# Patient Record
Sex: MALE | Race: OTHER | Employment: FULL TIME | URBAN - METROPOLITAN AREA
[De-identification: names, ages, dates, MRNs, and addresses within clinical notes are randomized per-mention and may not be internally consistent; named-entity substitution may affect disease eponyms.]

---

## 2020-11-19 ENCOUNTER — OFFICE VISIT (OUTPATIENT)
Dept: FAMILY MEDICINE CLINIC | Age: 45
End: 2020-11-19

## 2020-11-19 ENCOUNTER — HOSPITAL ENCOUNTER (OUTPATIENT)
Dept: LAB | Age: 45
Discharge: HOME | End: 2020-11-19

## 2020-11-19 VITALS
HEIGHT: 64 IN | WEIGHT: 147.6 LBS | BODY MASS INDEX: 25.2 KG/M2 | SYSTOLIC BLOOD PRESSURE: 108 MMHG | TEMPERATURE: 98.4 F | DIASTOLIC BLOOD PRESSURE: 63 MMHG | HEART RATE: 69 BPM

## 2020-11-19 DIAGNOSIS — R68.83 CHILLS: ICD-10-CM

## 2020-11-19 DIAGNOSIS — M25.50 POLYARTHRALGIA: Primary | ICD-10-CM

## 2020-11-19 DIAGNOSIS — M25.50 POLYARTHRALGIA: ICD-10-CM

## 2020-11-19 PROCEDURE — 99203 OFFICE O/P NEW LOW 30 MIN: CPT | Performed by: FAMILY MEDICINE

## 2020-11-19 NOTE — PROGRESS NOTES
HISTORY OF PRESENT ILLNESS  Charo Peña is a 39 y.o. male. HPI  Patient states he has been having multiple joint pains for the past 6 months,  It involves the hand, wrist, elbows, feet and ankles. Is bilateral  He has been taking diclofenac that seems to help. Patient has also noticed chills for the past week, denies fever, no cough, no sore throat or fever. 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 HENT: Negative for congestion, hearing loss, nosebleeds and sinus pain. Eyes: Negative for blurred vision, double vision and photophobia. Respiratory: Negative for cough, hemoptysis, sputum production and stridor. Cardiovascular: Negative for chest pain, palpitations and orthopnea. Gastrointestinal: Negative for abdominal pain, heartburn, nausea and vomiting. Genitourinary: Negative for dysuria, frequency and urgency. Musculoskeletal: Positive for joint pain and myalgias. Skin: Negative for itching and rash. Neurological: Negative for dizziness, tingling and headaches. /63 (BP 1 Location: Left arm, BP Patient Position: Sitting)   Pulse 69   Temp 98.4 °F (36.9 °C)   Ht 5' 4.37\" (1.635 m)   Wt 147 lb 9.6 oz (67 kg)   BMI 25.05 kg/m²   Physical Exam  Constitutional:       General: He is not in acute distress. Appearance: He is not ill-appearing. HENT:      Head: Normocephalic. Neck:      Musculoskeletal: Normal range of motion. No neck rigidity. Cardiovascular:      Rate and Rhythm: Normal rate and regular rhythm. Pulses: Normal pulses. Heart sounds: Normal heart sounds. No murmur. Pulmonary:      Effort: Pulmonary effort is normal. No respiratory distress. Breath sounds: Normal breath sounds. No wheezing or rhonchi.    Abdominal: General: Bowel sounds are normal. There is no distension. Palpations: Abdomen is soft. Tenderness: There is no abdominal tenderness. Hernia: No hernia is present. Musculoskeletal: Normal range of motion. General: No swelling or tenderness. Skin:     General: Skin is warm. Coloration: Skin is not pale. Findings: No erythema. Neurological:      Mental Status: He is alert. ASSESSMENT and PLAN  Diagnoses and all orders for this visit:    1. Polyarthralgia  -     LIPID PANEL; Future  -     CBC WITH AUTOMATED DIFF; Future  -     METABOLIC PANEL, COMPREHENSIVE; Future  -     NILE BY MULTIPLEX FLOW IA, QL; Future  -     SED RATE (ESR); Future  -     RHEUMATOID FACTOR, QT; Future  -     HEMOGLOBIN A1C WITH EAG; Future  -     URIC ACID; Future    2. Chills      We will check labs today to explore for gout, rheumatoid arthritis and lupus. Symptoms could also be secondary to wear and tear. He has recently developed chills too,  I have advise patient to consider COVID-19 testing.   He will go today

## 2020-11-19 NOTE — PROGRESS NOTES
Pt supplied with [facility] on [place] testing site for free from 4-5 PM with address provided for the patient, no appointment needed. Pt verbalized understanding, denied having any further questions. Yajaira Regalado.  [facility], [address] [facility]

## 2020-11-20 LAB
ALBUMIN SERPL-MCNC: 3.6 G/DL (ref 3.5–5)
ALBUMIN/GLOB SERPL: 0.9 {RATIO} (ref 1.1–2.2)
ALP SERPL-CCNC: 79 U/L (ref 45–117)
ALT SERPL-CCNC: 60 U/L (ref 12–78)
ANION GAP SERPL CALC-SCNC: 5 MMOL/L (ref 5–15)
AST SERPL-CCNC: 32 U/L (ref 15–37)
BASOPHILS # BLD: 0 K/UL (ref 0–0.1)
BASOPHILS NFR BLD: 1 % (ref 0–1)
BILIRUB SERPL-MCNC: 0.7 MG/DL (ref 0.2–1)
BUN SERPL-MCNC: 10 MG/DL (ref 6–20)
BUN/CREAT SERPL: 13 (ref 12–20)
CALCIUM SERPL-MCNC: 9.1 MG/DL (ref 8.5–10.1)
CHLORIDE SERPL-SCNC: 104 MMOL/L (ref 97–108)
CHOLEST SERPL-MCNC: 181 MG/DL
CO2 SERPL-SCNC: 29 MMOL/L (ref 21–32)
CREAT SERPL-MCNC: 0.77 MG/DL (ref 0.7–1.3)
DIFFERENTIAL METHOD BLD: NORMAL
EOSINOPHIL # BLD: 0.1 K/UL (ref 0–0.4)
EOSINOPHIL NFR BLD: 1 % (ref 0–7)
ERYTHROCYTE [DISTWIDTH] IN BLOOD BY AUTOMATED COUNT: 13.1 % (ref 11.5–14.5)
ERYTHROCYTE [SEDIMENTATION RATE] IN BLOOD: 67 MM/HR (ref 0–15)
EST. AVERAGE GLUCOSE BLD GHB EST-MCNC: 108 MG/DL
GLOBULIN SER CALC-MCNC: 4.2 G/DL (ref 2–4)
GLUCOSE SERPL-MCNC: 88 MG/DL (ref 65–100)
HBA1C MFR BLD: 5.4 % (ref 4–5.6)
HCT VFR BLD AUTO: 43.9 % (ref 36.6–50.3)
HDLC SERPL-MCNC: 31 MG/DL
HDLC SERPL: 5.8 {RATIO} (ref 0–5)
HGB BLD-MCNC: 14.3 G/DL (ref 12.1–17)
IMM GRANULOCYTES # BLD AUTO: 0 K/UL (ref 0–0.04)
IMM GRANULOCYTES NFR BLD AUTO: 0 % (ref 0–0.5)
LDLC SERPL CALC-MCNC: 124.6 MG/DL (ref 0–100)
LIPID PROFILE,FLP: ABNORMAL
LYMPHOCYTES # BLD: 1.5 K/UL (ref 0.8–3.5)
LYMPHOCYTES NFR BLD: 25 % (ref 12–49)
MCH RBC QN AUTO: 29.5 PG (ref 26–34)
MCHC RBC AUTO-ENTMCNC: 32.6 G/DL (ref 30–36.5)
MCV RBC AUTO: 90.5 FL (ref 80–99)
MONOCYTES # BLD: 0.6 K/UL (ref 0–1)
MONOCYTES NFR BLD: 10 % (ref 5–13)
NEUTS SEG # BLD: 3.9 K/UL (ref 1.8–8)
NEUTS SEG NFR BLD: 63 % (ref 32–75)
NRBC # BLD: 0 K/UL (ref 0–0.01)
NRBC BLD-RTO: 0 PER 100 WBC
PLATELET # BLD AUTO: 372 K/UL (ref 150–400)
PMV BLD AUTO: 10 FL (ref 8.9–12.9)
POTASSIUM SERPL-SCNC: 4.4 MMOL/L (ref 3.5–5.1)
PROT SERPL-MCNC: 7.8 G/DL (ref 6.4–8.2)
RBC # BLD AUTO: 4.85 M/UL (ref 4.1–5.7)
RHEUMATOID FACT SERPL-ACNC: >600 IU/ML
SODIUM SERPL-SCNC: 138 MMOL/L (ref 136–145)
TRIGL SERPL-MCNC: 127 MG/DL (ref ?–150)
URATE SERPL-MCNC: 4.3 MG/DL (ref 3.5–7.2)
VLDLC SERPL CALC-MCNC: 25.4 MG/DL
WBC # BLD AUTO: 6.2 K/UL (ref 4.1–11.1)

## 2020-11-21 LAB — ANA SER QL: POSITIVE

## 2020-11-23 ENCOUNTER — TELEPHONE (OUTPATIENT)
Dept: FAMILY MEDICINE CLINIC | Age: 45
End: 2020-11-23

## 2020-11-23 DIAGNOSIS — M05.79 RHEUMATOID ARTHRITIS INVOLVING MULTIPLE SITES WITH POSITIVE RHEUMATOID FACTOR (HCC): Primary | ICD-10-CM

## 2020-11-23 RX ORDER — CELECOXIB 200 MG/1
200 CAPSULE ORAL 2 TIMES DAILY
Qty: 60 CAP | Refills: 2 | Status: SHIPPED | OUTPATIENT
Start: 2020-11-23 | End: 2021-02-21

## 2020-11-24 ENCOUNTER — OFFICE VISIT (OUTPATIENT)
Dept: FAMILY MEDICINE CLINIC | Age: 45
End: 2020-11-24

## 2020-11-24 DIAGNOSIS — Z71.89 COUNSELING AND COORDINATION OF CARE: Primary | ICD-10-CM

## 2020-11-24 PROCEDURE — 99080 SPECIAL REPORTS OR FORMS: CPT

## 2020-11-24 NOTE — PROGRESS NOTES
OW called patient and started financial screening for Access Now. OW explained the process to patient, he verbalized understanding. Pending POI. An appt was scheduled for tomorrow 11/25/2020 at 12:00m. OW screened patient for Craig Foyer and he replied NO to all questions.

## 2020-11-25 ENCOUNTER — OFFICE VISIT (OUTPATIENT)
Dept: FAMILY MEDICINE CLINIC | Age: 45
End: 2020-11-25

## 2020-11-25 DIAGNOSIS — Z71.89 COUNSELING AND COORDINATION OF CARE: Primary | ICD-10-CM

## 2020-11-25 PROCEDURE — 99080 SPECIAL REPORTS OR FORMS: CPT

## 2020-11-25 NOTE — PROGRESS NOTES
OW met with patient and patient completed pending forms. Access Now application was completed. OW instructed the patient to call Access Now on or after Dec 9th.  [facility]

## 2021-03-03 ENCOUNTER — TELEPHONE (OUTPATIENT)
Dept: RHEUMATOLOGY | Age: 46
End: 2021-03-03

## 2021-03-04 ENCOUNTER — OFFICE VISIT (OUTPATIENT)
Dept: RHEUMATOLOGY | Age: 46
End: 2021-03-04
Payer: SUBSIDIZED

## 2021-03-04 VITALS
BODY MASS INDEX: 23.69 KG/M2 | HEART RATE: 65 BPM | TEMPERATURE: 97.5 F | DIASTOLIC BLOOD PRESSURE: 77 MMHG | SYSTOLIC BLOOD PRESSURE: 125 MMHG | WEIGHT: 142.2 LBS | RESPIRATION RATE: 16 BRPM | OXYGEN SATURATION: 97 % | HEIGHT: 65 IN

## 2021-03-04 DIAGNOSIS — M05.79 SEROPOSITIVE RHEUMATOID ARTHRITIS OF MULTIPLE SITES (HCC): Primary | ICD-10-CM

## 2021-03-04 DIAGNOSIS — Z79.899 LONG-TERM USE OF HYDROXYCHLOROQUINE: ICD-10-CM

## 2021-03-04 DIAGNOSIS — Z79.60 LONG-TERM USE OF IMMUNOSUPPRESSANT MEDICATION: ICD-10-CM

## 2021-03-04 PROCEDURE — 99205 OFFICE O/P NEW HI 60 MIN: CPT | Performed by: INTERNAL MEDICINE

## 2021-03-04 RX ORDER — METHOTREXATE 2.5 MG/1
20 TABLET ORAL
COMMUNITY
End: 2021-03-04 | Stop reason: SDUPTHER

## 2021-03-04 RX ORDER — FOLIC ACID 1 MG/1
1 TABLET ORAL DAILY
Qty: 90 TAB | Refills: 5 | Status: SHIPPED | OUTPATIENT
Start: 2021-03-04 | End: 2021-05-13 | Stop reason: SDUPTHER

## 2021-03-04 RX ORDER — METHOTREXATE 2.5 MG/1
20 TABLET ORAL
Qty: 96 TAB | Refills: 1 | Status: SHIPPED | OUTPATIENT
Start: 2021-03-10 | End: 2021-05-13 | Stop reason: SDUPTHER

## 2021-03-04 RX ORDER — HYDROXYCHLOROQUINE SULFATE 200 MG/1
400 TABLET, FILM COATED ORAL DAILY
Qty: 180 TAB | Refills: 1 | Status: SHIPPED | OUTPATIENT
Start: 2021-03-04 | End: 2021-05-13 | Stop reason: ALTCHOICE

## 2021-03-04 NOTE — PROGRESS NOTES
REASON FOR VISIT    This is the initial evaluation for Mr. Saul Goncalves a 39 y.o.  [facility] male for question of an inflammatory arthritis. The patient is referred to the [place] at the request of Dr. Jeff Hernandez. HISTORY OF PRESENT ILLNESS      I have reviewed and summarized old records from [employer]    With [address] translation. In 5/2020, he developed pain and swelling in his hands, wrists, elbows, ankles and feet. He took diclofenac with some relief    In 11/19/2020, labs showed WBC 6.2, lymphocytes 1.5, Hct 43.9%, platelets 599,829, creatinine 0.77 mg/dL, eGFR >60, albumin 3.6 g/dL, ALK 79 U/L, ALT 60 U/L, AST 32 U/L, uric acid 4.3 mg/dL, HbA1c 5.4%, elevated ESR 67 mm/hr,  positive NILE direct, rheumatoid factor >600    In 2/26/2021, he started prednisone 10 mg daily hydroxychloroquine 400 mg daily, methotrexate 12.5 mg every 8 days, prescribed by rheumatology in [facility]. Today, he complains of pain in his hands, wrists, ankles, and feet. He feels worse at rest and improves with activity. He has swelling in his hands, wrists, ankles and feet, which has improved. He has morning stiffness lasting hours without being on his currently medications. He is not working now. He used to work in powder washing. Therapy History includes:  Current DMARD therapy includes: hydroxychloroquine 400 mg daily (2/26/2021 to present), methotrexate 12.5 mg every Wednesday (2/26/2021 to present)  Prior DMARD therapy includes: none  The following DMARDs have been ineffective: none  The following DMARDs were stopped because of side effects: none    REVIEW OF SYSTEMS    A 15 point review of systems was performed and summarized below. The questionnaire was reviewed with the patient and scanned into the patient's medical record.     General: denies recent weight gain, recent weight loss, fatigue, weakness, fever, drenching night sweats  Musculoskeletal: endorses joint pain, joint swelling, denies morning stiffness, muscle pain  Ears: denies ringing in ears, hearing loss, deafness  Eyes: denies pain, light sensitive, redness, blindness, double vision, blurred vision, excess tearing, dryness, foreign body sensation  Mouth: denies sore tongue, oral ulcers, loss of taste, dryness, increased dental caries  Nose: denies nosebleeds, nasal ulcers  Throat: denies food stuck when swallowing, difficulty with swallowing, hoarseness, pain in jaw while chewing  Neck: denies swollen glands, tender glands  Cardiopulmonary: denies pain in chest with deep breaths, pain in chest when lying down, murmurs, sudden changes in heart beat, wheezing, dry cough, productive cough, shortness of breath at rest, shortness of breath on exertion, coughing of blood  Gastrointestinal: denies nausea, heartburn, stomach pain relieved by food, chronic constipation, chronic diarrhea, blood in stools, black stools  Genitourinary: denies pain or burning on urination, blood in urine, cloudy urine, penile ulcers  Hematologic: denies anemia, bleeding tendency, blood clots, bleeding gums  Skin: denies easy bruising, hair loss, rash, rash worsened after sun exposure, hives/urticaria, skin thickening, skin tightness, nodules/bumps, color changes of hands or feet in the cold (Raynaud's)  Neurologic: denies numbness or tingling in hands, numbness or tingling in feet, muscle weakness  Psychiatric: denies depression, excessive worries, PTSD, Bipolar  Sleep: endorses snoring, denies poor sleep (8 hours), apnea, daytime somnolence, difficulty falling asleep, difficulty staying asleep     PAST MEDICAL HISTORY    He has a past medical history of Rheumatoid arteritis ([facility] [address] (01/2021). FAMILY HISTORY    His family history includes Osteoporosis in his mother. SOCIAL HISTORY    He reports that he has quit smoking. He has never used smokeless tobacco. He reports current alcohol use of about 2.0 standard drinks of alcohol per week.  He reports that he does not use drugs. MEDICATIONS    Current Outpatient Medications   Medication Sig    prednisolone (PRELONE PO) Take 10 mg by mouth. Takes half tablet     [START ON 3/10/2021] methotrexate (RHEUMATREX) 2.5 mg tablet Take 8 Tabs by mouth every Wednesday.  hydrOXYchloroQUINE (Plaquenil) 200 mg tablet Take 2 Tabs by mouth daily. Takes half tablet    folic acid (FOLVITE) 1 mg tablet Take 1 Tab by mouth daily. No current facility-administered medications for this visit. ALLERGIES  No Known Allergies    PHYSICAL EXAMINATION    Visit Vitals  /77 (BP 1 Location: Left upper arm, BP Patient Position: Sitting, BP Cuff Size: Small adult)   Pulse 65   Temp 97.5 °F (36.4 °C) (Oral)   Resp 16   Ht 5' 5.35\" (1.66 m)   Wt 142 lb 3.2 oz (64.5 kg)   SpO2 97%   BMI 23.41 kg/m²     Body mass index is 23.41 kg/m². General: Patient is alert, oriented x 3, not in acute distress    HEENT:   Conjunctiva are not injected and appear moist, there is no alopecia. Cardiovascular:  Heart is regular rate and rhythm, no murmurs. Chest:  Lungs are clear to auscultation bilaterally. Extremities:  Free of clubbing, cyanosis, edema, extremities well perfused. Neurological exam:  Muscle strength is full in upper and lower extremities. Skin exam:  There are no rashes, no tophi, no psoriasis, no active Raynaud's, no livedo reticularis, no periungual erythema. Musculoskeletal exam:  A comprehensive musculoskeletal exam was performed for all joints of each upper and lower extremity and assessed for swelling, tenderness and range of motion.  Pertinent results are documented as below:    Bilateral ankle synovitis  Bilateral MTP tenderness    Z-Deformities:   no  [place] Neck Deformities:  Yes (LEFT: 2nd, 4th, RIGHT: 2nd, 3rd, 4th)  Boutonierre's Deformities:  no  Ulnar Deviation:   no  MCP Subluxation:  no    Joint Count 3/4/2021   Patient pain (0-100) 25   MHAQ 0   Left elbow - Tender 1   Left elbow - Swollen 1   Left wrist- Tender 1   Left wrist- Swollen 1   Left 1st MCP - Tender 1   Left 1st MCP - Swollen 1   Left 2nd MCP - Tender 1   Left 2nd MCP - Swollen 1   Left 3rd MCP - Tender 1   Left 3rd MCP - Swollen 1   Left 4th MCP - Tender 1   Left 4th MCP - Swollen 0   Left 5th MCP - Tender 1   Left 5th MCP - Swollen 1   Left thumb IP - Tender 1   Left thumb IP - Swollen 1   Left 2nd PIP - Tender 1   Left 2nd PIP - Swollen 1   Left 3rd PIP - Tender 1   Left 3rd PIP - Swollen 1   Left 4th PIP - Tender 1   Left 4th PIP - Swollen 1   Left 5th PIP - Tender 1   Left 5th PIP - Swollen 1   Right elbow - Tender 1   Right elbow - Swollen 1   Right wrist- Tender 1   Right wrist- Swollen 1   Right 1st MCP - Tender 1   Right 1st MCP - Swollen 1   Right 2nd MCP - Tender 1   Right 2nd MCP - Swollen 0   Right 3rd MCP - Tender 1   Right 3rd MCP - Swollen 1   Right 4th MCP - Tender 1   Right 4th MCP - Swollen 1   Right 5th MCP - Tender 1   Right 5th MCP - Swollen 1   Right thumb IP - Tender 1   Right thumb IP - Swollen 1   Right 2nd PIP - Tender 1   Right 2nd PIP - Swollen 1   Right 3rd PIP - Tender 1   Right 3rd PIP - Swollen 1   Right 4th PIP - Tender 1   Right 4th PIP - Swollen 1   Right 5th PIP - Tender 1   Right 5th PIP - Swollen 1   Right knee - Tender 1   Right knee - Swollen 1   Tender Joint Count (Total) 25   Swollen Joint Count (Total) 23   Physician Assessment (0-10) 5   Patient Assessment (0-10) 2.5   CDAI Total (calculated) 55.5       DATA REVIEW    Prior medical records were reviewed and are summarized as below:    Laboratory data: summarized in the HPI    Imaging: summarized in the HPI. ASSESSMENT AND PLAN    1) Seropositive Rheumatoid Arthritis. He appears to have chronic Rheumatoid Arthritis that started around 5/2020. He was recently started on prednisone 10 mg daily, hydroxychloroquine 400 mg daily, and methotrexate 12.5 mg every Wednesday.  These medications were prescribed by his doctor in [place]    He feels much better on treated. His CDAI was 55.5 with 25 tender and 23 swollen joints, with bilateral ankle and MTP involvement, consistent with high disease activity. I will increase methotrexate to 20 mg every Wednesday and will reduce his prednisone to 5 mg daily until he has completed his box. I ordered labs. 2) Long Term Use of Immunosuppressants. The patient remains on high risk immunomodulatory medications (methotrexate) and requires frequent toxicity monitoring by blood work to evaluate for toxicities. The patient's most recent labs on 11/19/2020, which I reviewed with them, were unremarkable. Respective labs were ordered (CBC and CMP) and lab slip provided. 3) Long Term Use of Hydroxychloroquine (Plaquenil). The patient voiced understanding of the aforementioned assessment and plan. Summary of plan was provided in the After Visit Summary patient instructions. I also provided education about MyChart setup and utility. A total of 62 minutes was spent on this visit, reviewing interval notes, interval testing results, ordering tests, refilling medications, documenting the findings in the note, patient education, counseling, and coordination of care as described above. All questions asked and answered.     TODAY'S ORDERS    Orders Placed This Encounter    QUANTIFERON-TB GOLD PLUS    CYCLIC CITRUL PEPTIDE AB, IGG    CBC WITH AUTOMATED DIFF    CHRONIC HEPATITIS PANEL    METABOLIC PANEL, COMPREHENSIVE    C REACTIVE PROTEIN, QT    SED RATE (ESR)    PROTEIN ELECTROPHORESIS W/ REFLX DON    prednisolone (PRELONE PO)    methotrexate (RHEUMATREX) 2.5 mg tablet    hydrOXYchloroQUINE (Plaquenil) 200 mg tablet    folic acid (FOLVITE) 1 mg tablet     Future Appointments   Date Time Provider Michelle Ho   5/13/2021 11:20 AM Damien Marin MD AOCR BS AMB     Susan Rob MD, [address] [facility] [address]    Adult Rheumatology   Rheumatology Ultrasound Certified  Ronald ERICKSON Part of [facility]CTORS [facility], [address] [place] [address] Road   Phone [phone]  Fax [phone]    Patient Care Team:  Tanya Blackwell MD as PCP - General (Family Medicine)  Tanya Blackwell MD as PCP - [facility]

## 2021-03-04 NOTE — LETTER
3/4/2021 Patient: Jaja Ha YOB: 1975 Date of Visit: 3/4/2021 Ricki Almaraz MD 
[address] [facility] [address] [address] [address] [employer] Dear Ricki Almaraz MD, Thank you for referring Mr. Staci Garcia to [address] [place] [address] for evaluation. My notes for this consultation are attached. If you have questions, please do not hesitate to call me. I look forward to following your patient along with you.  
 
 
Sincerely, 
 
Sujatha Guevara MD

## 2021-03-04 NOTE — PATIENT INSTRUCTIONS
Increase methotrexate to 8 tablets every Wednesday at bedtime    Increase Plaquenil to 400 mg daily    Reduce prednisone to 5 mg daily (quarter of 20 mg)    Take folic acid 1 mg daily    Do blood work at [facility]. Aumente el metotrexato a 8 tabletas todos los miércoles a la hora de acostarse    Aumente el Plaquenil a 400 mg al día. Reduce la prednisona a 5 mg al día (un cuarto de 20 mg)    [place] 1 mg de ácido fólico al día. Realice análisis de [place].

## 2021-03-04 NOTE — PROGRESS NOTES
Chief Complaint   Patient presents with    Establish Care    Arthritis     fingers,hands,toes,wrists and ankles     Patient states he was unable to move or get dressed before he saw doctor in 01/21. He has lived here 25 years. 3 most recent PHQ Screens 3/4/2021   Little interest or pleasure in doing things Not at all   Feeling down, depressed, irritable, or hopeless Not at all   Total Score PHQ 2 0     Visit Vitals  /77 (BP 1 Location: Left upper arm, BP Patient Position: Sitting, BP Cuff Size: Small adult)   Pulse 65   Temp 97.5 °F (36.4 °C) (Oral)   Resp 16   Ht 5' 5.35\" (1.66 m)   Wt 142 lb 3.2 oz (64.5 kg)   SpO2 97%   BMI 23.41 kg/m²     1. Have you been to the ER, urgent care clinic since your last visit? Hospitalized since your last visit?no    2. Have you seen or consulted any other health care providers outside of the [address] [place] [address] since your last visit? Include any pap smears or colon screening.  no

## 2021-03-17 ENCOUNTER — TELEPHONE (OUTPATIENT)
Dept: FAMILY MEDICINE CLINIC | Age: 46
End: 2021-03-17

## 2021-03-17 NOTE — TELEPHONE ENCOUNTER
[facility] called the patient and I told the pt to come Friday 3/19 for a lab visit. Patient stated she will be come to the [facility] office in the morning. From: Miranda Craven MD   Sent: 3/17/2021   8:49 AM EDT   To: Citlali Al   Subject: RE: PATIENT REQUESTS                          Yes Adam Vick,     I will also be there tomorrow if he wants to come in for lab visit tomorrow   ----- Message -----   From: Lyndon Hernández   Sent: 3/17/2021   8:16 AM EDT   To: Lucas Cage MD   Subject: PATIENT REQUESTS                         Good morning Dr. Jaye Moon patient came yesterday 3/16,to the [place] [address] at [facility]. I made copy of his [facility] visit to Dr. Melo Collado MD. Patient stated his rheumatologist ordered labs. I told the patient I was going to contact you to be sure if he needs to call the [facility] and make a f  appointment with you before you ordered the labs or if we just need  to schedule his labs at [facility] or [facility].   I checked his chart and Dr Melany Sher ordered his labs and are the same as the AVS. Do you need to post the same labs with your name or can we offer a lab appointment and draw the labs and use your name as the Dr? Please let me know that I can call the patient and telling him to come this Friday to do his labs. Fridays we have only one doctor and Naheed Rico will be doing labs.  Thank you, Citlali Al

## 2021-03-18 ENCOUNTER — TELEPHONE (OUTPATIENT)
Dept: FAMILY MEDICINE CLINIC | Age: 46
End: 2021-03-18

## 2021-03-18 DIAGNOSIS — M05.79 RHEUMATOID ARTHRITIS INVOLVING MULTIPLE SITES WITH POSITIVE RHEUMATOID FACTOR (HCC): Primary | ICD-10-CM

## 2021-03-19 ENCOUNTER — HOSPITAL ENCOUNTER (OUTPATIENT)
Dept: LAB | Age: 46
Discharge: HOME | End: 2021-03-19

## 2021-03-19 ENCOUNTER — LAB ONLY (OUTPATIENT)
Dept: FAMILY MEDICINE CLINIC | Age: 46
End: 2021-03-19

## 2021-03-19 DIAGNOSIS — M05.79 RHEUMATOID ARTHRITIS INVOLVING MULTIPLE SITES WITH POSITIVE RHEUMATOID FACTOR (HCC): ICD-10-CM

## 2021-03-19 PROCEDURE — 80074 ACUTE HEPATITIS PANEL: CPT

## 2021-03-19 PROCEDURE — 84165 PROTEIN E-PHORESIS SERUM: CPT

## 2021-03-19 PROCEDURE — 86140 C-REACTIVE PROTEIN: CPT

## 2021-03-19 PROCEDURE — 86480 TB TEST CELL IMMUN MEASURE: CPT

## 2021-03-19 PROCEDURE — 86200 CCP ANTIBODY: CPT

## 2021-03-19 PROCEDURE — 85025 COMPLETE CBC W/AUTO DIFF WBC: CPT

## 2021-03-19 PROCEDURE — 85652 RBC SED RATE AUTOMATED: CPT

## 2021-03-19 PROCEDURE — 80053 COMPREHEN METABOLIC PANEL: CPT

## 2021-03-20 LAB
ALBUMIN SERPL-MCNC: 3.9 G/DL (ref 3.5–5)
ALBUMIN/GLOB SERPL: 0.8 {RATIO} (ref 1.1–2.2)
ALP SERPL-CCNC: 75 U/L (ref 45–117)
ALT SERPL-CCNC: 68 U/L (ref 12–78)
ANION GAP SERPL CALC-SCNC: 4 MMOL/L (ref 5–15)
AST SERPL-CCNC: 35 U/L (ref 15–37)
BASOPHILS # BLD: 0 K/UL (ref 0–0.1)
BASOPHILS NFR BLD: 1 % (ref 0–1)
BILIRUB SERPL-MCNC: 1 MG/DL (ref 0.2–1)
BUN SERPL-MCNC: 12 MG/DL (ref 6–20)
BUN/CREAT SERPL: 15 (ref 12–20)
CALCIUM SERPL-MCNC: 9.1 MG/DL (ref 8.5–10.1)
CHLORIDE SERPL-SCNC: 106 MMOL/L (ref 97–108)
CO2 SERPL-SCNC: 30 MMOL/L (ref 21–32)
COMMENT, HOLDF: NORMAL
CREAT SERPL-MCNC: 0.8 MG/DL (ref 0.7–1.3)
CRP SERPL-MCNC: 0.33 MG/DL (ref 0–0.6)
DIFFERENTIAL METHOD BLD: ABNORMAL
EOSINOPHIL # BLD: 0.2 K/UL (ref 0–0.4)
EOSINOPHIL NFR BLD: 5 % (ref 0–7)
ERYTHROCYTE [DISTWIDTH] IN BLOOD BY AUTOMATED COUNT: 14.6 % (ref 11.5–14.5)
ERYTHROCYTE [SEDIMENTATION RATE] IN BLOOD: 34 MM/HR (ref 0–15)
GLOBULIN SER CALC-MCNC: 4.9 G/DL (ref 2–4)
GLUCOSE SERPL-MCNC: 89 MG/DL (ref 65–100)
HCT VFR BLD AUTO: 46.4 % (ref 36.6–50.3)
HGB BLD-MCNC: 15 G/DL (ref 12.1–17)
IMM GRANULOCYTES # BLD AUTO: 0 K/UL (ref 0–0.04)
IMM GRANULOCYTES NFR BLD AUTO: 0 % (ref 0–0.5)
LYMPHOCYTES # BLD: 1.1 K/UL (ref 0.8–3.5)
LYMPHOCYTES NFR BLD: 28 % (ref 12–49)
MCH RBC QN AUTO: 28.7 PG (ref 26–34)
MCHC RBC AUTO-ENTMCNC: 32.3 G/DL (ref 30–36.5)
MCV RBC AUTO: 88.7 FL (ref 80–99)
MONOCYTES # BLD: 0.5 K/UL (ref 0–1)
MONOCYTES NFR BLD: 14 % (ref 5–13)
NEUTS SEG # BLD: 2 K/UL (ref 1.8–8)
NEUTS SEG NFR BLD: 52 % (ref 32–75)
NRBC # BLD: 0 K/UL (ref 0–0.01)
NRBC BLD-RTO: 0 PER 100 WBC
PLATELET # BLD AUTO: 275 K/UL (ref 150–400)
PMV BLD AUTO: 10.1 FL (ref 8.9–12.9)
POTASSIUM SERPL-SCNC: 4 MMOL/L (ref 3.5–5.1)
PROT SERPL-MCNC: 8.8 G/DL (ref 6.4–8.2)
RBC # BLD AUTO: 5.23 M/UL (ref 4.1–5.7)
SAMPLES BEING HELD,HOLD: NORMAL
SODIUM SERPL-SCNC: 140 MMOL/L (ref 136–145)
WBC # BLD AUTO: 3.9 K/UL (ref 4.1–11.1)

## 2021-03-22 LAB
HAV IGM SER QL: NONREACTIVE
HBV CORE IGM SER QL: NONREACTIVE
HBV SURFACE AG SER QL: <0.1 INDEX
HBV SURFACE AG SER QL: NEGATIVE
HCV AB SERPL QL IA: NONREACTIVE
HCV COMMENT,HCGAC: NORMAL
M TB IFN-G BLD-IMP: NEGATIVE
QUANTIFERON CRITERIA, QFI1T: NORMAL
QUANTIFERON MITOGEN VALUE: >10 IU/ML
QUANTIFERON NIL VALUE: 0.16 IU/ML
QUANTIFERON TB1 AG: 0.16 IU/ML
QUANTIFERON TB2 AG: 0.14 IU/ML
SP1: NORMAL
SP2: NORMAL
SP3: NORMAL

## 2021-03-23 LAB
ALBUMIN SERPL ELPH-MCNC: 3.7 G/DL (ref 2.9–4.4)
ALBUMIN/GLOB SERPL: 0.8 {RATIO} (ref 0.7–1.7)
ALPHA1 GLOB SERPL ELPH-MCNC: 0.2 G/DL (ref 0–0.4)
ALPHA2 GLOB SERPL ELPH-MCNC: 0.7 G/DL (ref 0.4–1)
B-GLOBULIN SERPL ELPH-MCNC: 1.1 G/DL (ref 0.7–1.3)
CCP IGA+IGG SERPL IA-ACNC: 186 UNITS (ref 0–19)
GAMMA GLOB SERPL ELPH-MCNC: 2.8 G/DL (ref 0.4–1.8)
GLOBULIN SER CALC-MCNC: 4.7 G/DL (ref 2.2–3.9)
M PROTEIN SERPL ELPH-MCNC: 1.8 G/DL
PROT SERPL-MCNC: 8.4 G/DL (ref 6–8.5)

## 2021-05-13 ENCOUNTER — OFFICE VISIT (OUTPATIENT)
Dept: RHEUMATOLOGY | Age: 46
End: 2021-05-13
Payer: SUBSIDIZED

## 2021-05-13 VITALS
HEIGHT: 65 IN | SYSTOLIC BLOOD PRESSURE: 95 MMHG | DIASTOLIC BLOOD PRESSURE: 60 MMHG | WEIGHT: 145 LBS | BODY MASS INDEX: 24.16 KG/M2 | HEART RATE: 84 BPM | TEMPERATURE: 97.8 F | RESPIRATION RATE: 18 BRPM

## 2021-05-13 DIAGNOSIS — M05.79 SEROPOSITIVE RHEUMATOID ARTHRITIS OF MULTIPLE SITES (HCC): Primary | ICD-10-CM

## 2021-05-13 DIAGNOSIS — M05.79 SEROPOSITIVE RHEUMATOID ARTHRITIS OF MULTIPLE SITES (HCC): ICD-10-CM

## 2021-05-13 PROCEDURE — 99215 OFFICE O/P EST HI 40 MIN: CPT | Performed by: INTERNAL MEDICINE

## 2021-05-13 RX ORDER — ONDANSETRON 2 MG/ML
8 INJECTION INTRAMUSCULAR; INTRAVENOUS AS NEEDED
Status: CANCELLED | OUTPATIENT
Start: 2021-05-13

## 2021-05-13 RX ORDER — HEPARIN 100 UNIT/ML
300-500 SYRINGE INTRAVENOUS AS NEEDED
Status: CANCELLED
Start: 2021-05-13

## 2021-05-13 RX ORDER — DIPHENHYDRAMINE HYDROCHLORIDE 50 MG/ML
50 INJECTION, SOLUTION INTRAMUSCULAR; INTRAVENOUS AS NEEDED
Status: CANCELLED
Start: 2021-05-13

## 2021-05-13 RX ORDER — ACETAMINOPHEN 325 MG/1
650 TABLET ORAL ONCE
Status: CANCELLED
Start: 2021-05-13 | End: 2021-05-13

## 2021-05-13 RX ORDER — DIPHENHYDRAMINE HCL 25 MG
50 CAPSULE ORAL ONCE
Status: CANCELLED | OUTPATIENT
Start: 2021-05-13 | End: 2021-05-13

## 2021-05-13 RX ORDER — SODIUM CHLORIDE 9 MG/ML
25 INJECTION, SOLUTION INTRAVENOUS CONTINUOUS
Status: CANCELLED | OUTPATIENT
Start: 2021-05-13

## 2021-05-13 RX ORDER — DIPHENHYDRAMINE HYDROCHLORIDE 50 MG/ML
25 INJECTION, SOLUTION INTRAMUSCULAR; INTRAVENOUS AS NEEDED
Status: CANCELLED
Start: 2021-05-13

## 2021-05-13 RX ORDER — METHOTREXATE 2.5 MG/1
20 TABLET ORAL
Qty: 96 TAB | Refills: 1 | Status: SHIPPED | OUTPATIENT
Start: 2021-05-19 | End: 2021-08-31 | Stop reason: SDUPTHER

## 2021-05-13 RX ORDER — HYDROCORTISONE SODIUM SUCCINATE 100 MG/2ML
100 INJECTION, POWDER, FOR SOLUTION INTRAMUSCULAR; INTRAVENOUS AS NEEDED
Status: CANCELLED | OUTPATIENT
Start: 2021-05-13

## 2021-05-13 RX ORDER — FOLIC ACID 1 MG/1
1 TABLET ORAL DAILY
Qty: 90 TAB | Refills: 5 | Status: SHIPPED | OUTPATIENT
Start: 2021-05-13 | End: 2021-08-31 | Stop reason: SDUPTHER

## 2021-05-13 RX ORDER — ALBUTEROL SULFATE 0.83 MG/ML
2.5 SOLUTION RESPIRATORY (INHALATION) AS NEEDED
Status: CANCELLED
Start: 2021-05-13

## 2021-05-13 RX ORDER — EPINEPHRINE 1 MG/ML
0.3 INJECTION, SOLUTION, CONCENTRATE INTRAVENOUS AS NEEDED
Status: CANCELLED | OUTPATIENT
Start: 2021-05-13

## 2021-05-13 RX ORDER — SODIUM CHLORIDE 0.9 % (FLUSH) 0.9 %
10 SYRINGE (ML) INJECTION AS NEEDED
Status: CANCELLED | OUTPATIENT
Start: 2021-05-13

## 2021-05-13 RX ORDER — HYDROXYCHLOROQUINE SULFATE 200 MG/1
400 TABLET, FILM COATED ORAL DAILY
Qty: 180 TAB | Refills: 1 | Status: CANCELLED | OUTPATIENT
Start: 2021-05-13

## 2021-05-13 RX ORDER — ACETAMINOPHEN 325 MG/1
650 TABLET ORAL AS NEEDED
Status: CANCELLED
Start: 2021-05-13

## 2021-05-13 RX ORDER — SODIUM CHLORIDE 9 MG/ML
10 INJECTION INTRAMUSCULAR; INTRAVENOUS; SUBCUTANEOUS AS NEEDED
Status: CANCELLED | OUTPATIENT
Start: 2021-05-13

## 2021-05-13 NOTE — LETTER
5/13/2021 Patient: Deyanira Bah YOB: 1975 Date of Visit: 5/13/2021 Ni Jones MD 
[place] [address] [address] [facility] [address] [address] [address] [employer] Dear Ni Jones MD, Thank you for referring Mr. Yen Smith to [address] [place] [address] for evaluation. My notes for this consultation are attached. If you have questions, please do not hesitate to call me. I look forward to following your patient along with you.  
 
 
Sincerely, 
 
Khoa Barnard MD

## 2021-05-13 NOTE — PATIENT INSTRUCTIONS
Infliximab-abda (Por inyección)   Se [place] para tratar la artritis reumatoide, artritis psoriásica, espondilitis anquilosante, enfermedad de Crohn, las placas de psoriasis y colitis ulcerativa. Yessica(s) : Renflexis   Existen muchas otras marcas de kendy medicamento. Kendy medicamento no debe ser usado cuando:   Kendy medicamento no es adecuado para todas las personas. Usted no debe recibirlo si rivera tenido akosua reacción alérgica a los productos con infliximab o a las proteínas murinas (ratón). Forma de usar kendy medicamento:   Mireya Santos  · Jay médico le prescribirá jay dosis y horario. Kendy medicamento se administra a través de akosua aguja en la vena. Kendy medicamento debe administrarse lentamente. La aguja tiene que permanecer colocada abdirahman al menos 2 horas. · Gladys Elder u otro médico le administrará kendy medicamento. · [facility] debe venir con Julianna Barragan del medicamento. Solicite akosua copia con jay farmacéutico en lorena de no tener la guía. Medicamentos y [employer] que debe evitar:   Consulte con jay médico o farmacéutico antes de usar cualquier medicamento, incluyendo los que compra sin receta médica, las vitaminas y los productos herbales. · Algunos alimentos y medicamentos pueden afectar el funcionamiento de infliximab-abda. Informe a jay médico si está usando cualquiera de los siguientes:  ¨ Abatacept, anakinra, ciclosporina, etanercept, teofilina, tocilizumab  ¨ Anticoagulantes (incluyendo warfarina)  ¨ Medicamentos que debilitan el sistema inmunológico (incluyendo metotrexato o un esteroide)  · Infórmele al médico si usted ha recibido fototerapia para tratar la psoriasis. · Usted no debe recibir akosua vacuna sin aprobación médica. More[place] Loges de virus vivos podría causar akosua infección. Los niños deben estar al corriente con todas las vacunas antes de comenzar el tratamiento con kendy medicamento. Si usted recibió infliximab-abda abdirahman el embarazo, informe al médico de jay bebé.   Precauciones abdirahman el uso de kendy medicamento:   · Infórmele al médico si usted está embarazada o dando de lactar, o si padece de insuficiencia cardíaca, enfermedad de obstrucción pulmonar crónica (EOPC), enfermedad hepática, un trastorno sanguíneo, problemas de la damian o [address], cáncer, o enfermedad cardíaca. Infórmele al médico si usted tiene antecedentes de convulsiones, esclerosis múltiple, síndrome de Guillain-Barré, u otra enfermedad similar del sistema nervioso. · Kendy medicamento puede causar los siguientes problemas:  ¨ Mayor riesgo de linfoma u otros cánceres (inclusive cáncer de la piel)  ¨ Daño hepático  ¨ Reacción a la infusión abdirahman o después de la infusión, o si comienza a usar el medicamento nuevamente después de no recibirlo por un tiempo prolongado  ¨ Síndrome parecido al lupus  · [place] medicamento podría causarle sangrado, moretones, y que desarrolle infecciones con más facilidad. [place] precauciones para prevenir enfermedades y lesiones. Lávese las bina frecuentemente. · Kendy medicamento aumenta guzman riego de contraer infecciones, y puede resultar en akosua enfermedad severa o de peligro mortal. Infórmele a guzman médico si usted tiene un historial de infecciones frecuentes o serias, incluyendo tuberculosis o hepatitis B. Asegúrese que guzman médico sepa si tiene akosua infección actual o si padece de problemas con el sistema inmunológico. El riesgo puede ser [place] en personas mayores de 72 años o que tienen diabetes. Evite a las personas con enfermedades, y [employer]. · Guzman médico tendrá que revisar guzman progreso y los efectos de kendy medicamento abdirahman russ citas regulares. Cumpla sin falta con todas russ citas médicas. Asista a todas russ citas.   Efectos secundarios que pueden presentarse abdirahman el uso de kendy medicamento:   Consulte inmediatamente con el médico si nota cualquiera de estos efectos secundarios:  · Reacción alérgica: Comezón o ronchas, hinchazón del liliane o las bina, hinchazón u hormigueo en la boca o garganta, opresión en el pecho, dificultad para respirar  · [facility] ([place]) oscura o heces pálidas, náuseas, vómitos, falta de apetito, dolor estomacal, coloración amarillenta en la piel u ojos  · Facundo, escalofríos, tos, flujo o congestión nasal, dolor de garganta, shu de cuerpo  · Dolor o inflamación en las articulaciones o los músculos, problemas para tragar  · Dolor de marcia, desvanecimiento, mareos, dificultad para respirar, sarpullido  · Convulsiones, adormecimiento, hormigueo, problemas con la vista, el habla o al caminar  · [employer], moreton, cansancio o debilidad  · Piel tibia, lesia, inflamada o adolorida, ampollas o llagas en la piel  Consulte con el médico si nota los siguientes efectos secundarios menos graves:   · Enrojecimiento, dolor, o inflamación donde se coloca la aguja  Consulte con el médico si nota otros efectos secundarios que shamika son causados por brody medicamento. Llame a guzman médico para consultarle Richy. Usted puede notificar russ efectos secundarios al FDA al [phone]. © 2017 [facility] Information is for End User's use only and may not be sold, redistributed or otherwise used for commercial purposes. Esta información es sólo para uso en educación. Guzman intención no es darle un consejo médico sobre enfermedades o tratamientos. Colsulte con guzman [place] farmacéutico antes de seguir cualquier régimen médico para saber si es seguro y efectivo para usted.

## 2021-05-13 NOTE — PROGRESS NOTES
REASON FOR VISIT    This is a follow-up visit for Mr. Lidia Roberson for     ICD-10-CM   1. Seropositive rheumatoid arthritis of multiple sites ([facility].)  M05.79     Inflammatory arthritis phenotype includes:  Anti-CCP positive: yes (186)  Rheumatoid factor positive: yes (>600)  Erosive disease: N/A  Extra-articular manifestations include: none    Immunosuppression Screening (3/19/2021): Quantiferon TB: negative  PPD:  Not performed  Hepatitis B: negative  Hepatitis C: negative    Therapy History includes:  Current DMARD therapy includes: hydroxychloroquine 400 mg daily (2/26/2021 to present), methotrexate 20 mg every Wednesday (3/04/2021 to present)  Prior DMARD therapy includes: methotrexate 12.5 mg every Wednesday (2/26/2021 to 3/04/2021)  The following DMARDs have been ineffective: none  The following DMARDs were stopped because of side effects: none    HISTORY OF PRESENT ILLNESS    Mr. Lidia Roberson returns for a follow-up. On his last visit, I continued hydroxychloroquine 400 mg daily and increased his methotrexate from 15 to 20 mg every Wednesday and will reduce his prednisone to 5 mg daily until he has completed his box. Today, he feels good but has pain and swelling in his left wrist and bilateral ankle. His pain is persistent all day. He feels a little better with hot water and prednisone if he takes it. He feels that his nose is dry. He also develops a pruritis if he is exposed to the sun. He denies fever, weight loss, blurred vision, vision loss, oral ulcers, ankle swelling, dry cough, dyspnea, nausea, vomiting, dysphagia, abdominal pain, black or bloody stool, fall since last visit, rash, easy bruising and increased thirst.    Most recent toxicity monitoring by blood work was done on 3/19/2021 and did not reveal any significant adverse effects    Most recent inflammatory markers from 3/19/2021 revealed a ESR 34 mm/hr and CRP 0.33 mg/L.     I reviewed the patient's interval medical history, surgical history, medications, and allergies. The patient has not had any interval hospital admissions, infections, or surgeries. REVIEW OF SYSTEMS    A comprehensive review of systems was performed and pertinent results are documented in the HPI, review of systems is otherwise non-contributory. PAST MEDICAL HISTORY    He has a past medical history of Rheumatoid arteritis ([facility] (01/2021). FAMILY HISTORY    His family history includes Osteoporosis in his mother. SOCIAL HISTORY    He reports that he has quit smoking. He has never used smokeless tobacco. He reports current alcohol use of about 2.0 standard drinks of alcohol per week. He reports that he does not use drugs. MEDICATIONS    Current Outpatient Medications   Medication Sig    [START ON 5/19/2021] methotrexate (RHEUMATREX) 2.5 mg tablet Take 8 Tabs by mouth every Wednesday.  folic acid (FOLVITE) 1 mg tablet Take 1 Tab by mouth daily.  prednisolone (PRELONE PO) Take 10 mg by mouth. Takes half tablet      No current facility-administered medications for this visit. ALLERGIES    No Known Allergies    PHYSICAL EXAMINATION    Visit Vitals  BP 95/60   Pulse 84   Temp 97.8 °F (36.6 °C)   Resp 18   Ht 5' 5\" (1.651 m)   Wt 145 lb (65.8 kg)   BMI 24.13 kg/m²     Body mass index is 24.13 kg/m². General: Patient is alert, oriented x 3, not in acute distress    HEENT:   Conjunctiva are not injected and appear moist, there is no alopecia. Cardiovascular:  Heart is regular rate and rhythm, no murmurs. Chest:  Lungs are clear to auscultation bilaterally. Extremities:  Free of clubbing, cyanosis, edema, extremities well perfused. Neurological exam:  Muscle strength is full in upper and lower extremities. Skin exam:  There are no rashes, no tophi, no psoriasis, no active Raynaud's, no livedo reticularis, no periungual erythema.     Musculoskeletal exam:  A comprehensive musculoskeletal exam was performed for all joints of each upper and lower extremity and assessed for swelling, tenderness and range of motion.  Positive results are documented as below:    Bilateral ankle synovitis  Bilateral MTP tenderness (RESOLVED)     Z-Deformities:                         no  [place] Neck Deformities:         Yes (LEFT: 2nd, 4th, RIGHT: 2nd, 3rd, 4th)  Boutonierre's Deformities:      no  Ulnar Deviation:                      no  MCP Subluxation:                   no       Joint Count 5/13/2021 3/4/2021   Patient pain (0-100) 80 25   MHAQ 1 0   Left elbow - Tender - 1   Left elbow - Swollen - 1   Left wrist- Tender 1 1   Left wrist- Swollen 1 1   Left 1st MCP - Tender 1 1   Left 1st MCP - Swollen 1 1   Left 2nd MCP - Tender - 1   Left 2nd MCP - Swollen - 1   Left 3rd MCP - Tender - 1   Left 3rd MCP - Swollen - 1   Left 4th MCP - Tender - 1   Left 4th MCP - Swollen - 0   Left 5th MCP - Tender 1 1   Left 5th MCP - Swollen 0 1   Left thumb IP - Tender - 1   Left thumb IP - Swollen - 1   Left 2nd PIP - Tender 1 1   Left 2nd PIP - Swollen 1 1   Left 3rd PIP - Tender 0 1   Left 3rd PIP - Swollen 1 1   Left 4th PIP - Tender 0 1   Left 4th PIP - Swollen 1 1   Left 5th PIP - Tender - 1   Left 5th PIP - Swollen - 1   Right elbow - Tender - 1   Right elbow - Swollen - 1   Right wrist- Tender 1 1   Right wrist- Swollen 1 1   Right 1st MCP - Tender 1 1   Right 1st MCP - Swollen 1 1   Right 2nd MCP - Tender - 1   Right 2nd MCP - Swollen - 0   Right 3rd MCP - Tender 1 1   Right 3rd MCP - Swollen 1 1   Right 4th MCP - Tender 1 1   Right 4th MCP - Swollen 0 1   Right 5th MCP - Tender 1 1   Right 5th MCP - Swollen 0 1   Right thumb IP - Tender - 1   Right thumb IP - Swollen - 1   Right 2nd PIP - Tender 1 1   Right 2nd PIP - Swollen 0 1   Right 3rd PIP - Tender 1 1   Right 3rd PIP - Swollen 1 1   Right 4th PIP - Tender 0 1   Right 4th PIP - Swollen 1 1   Right 5th PIP - Tender 0 1   Right 5th PIP - Swollen 1 1   Right knee - Tender - 1   Right knee - Swollen - 1 Tender Joint Count (Total) 11 25   Swollen Joint Count (Total) 11 23   Physician Assessment (0-10) 4 5   Patient Assessment (0-10) 8 2.5   CDAI Total (calculated) 34 55.5       DATA REVIEW    Laboratory     Recent laboratory results were reviewed, summarized, and discussed with the patient. Imaging    Musculoskeletal Ultrasound    None    Radiographs    None    CT Imaging    None    MR Imaging    None    DXA     None    ASSESSMENT AND PLAN    This is a follow-up visit for Mr. Marlyn Mccarthy. 1) Seropositive Rheumatoid Arthritis. He appears to have chronic Rheumatoid Arthritis that started around 5/2020. He feels much better on treated.     His CDAI was 55.5 with 25 tender and 23 swollen joints, with bilateral ankle and MTP involvement, consistent with high disease activity.     He is maintained on hydroxychloroquine 400 mg daily and methotrexate to 20 mg every Wednesday, which he has taken with good tolerance but continues to have active disease. He has the [facility] care card. I discussed with him about advancing therapy to a biologic (anti-TNF). We discussed the potential adverse effects, which include infections, such as upper respiratory infections, latent TB reactivation, viral hepatitis activation, and routes of administration (oral versus infusion versus subcutaneous). The patient was informed about the low risk for lymphoma based on at least 5 years of post-market data and the likely inherent relation between chronic autoimmune diseases, such as Rheumatoid Arthritis, and lymphoma. The patient was informed these medications co-pay are subject to the patient's insurance coverage and we will not know until it has been submitted to the insurance company. She preferred infliximab. An order will be submitted today to [facility]. I plan to stop hydroxychloroquine. 2) Long Term Use of Immunosuppressants.  The patient remains on high risk immunomodulatory medications (methotrexate) and requires frequent toxicity monitoring by blood work to evaluate for toxicities.    3) Long Term Use of Hydroxychloroquine (Plaquenil). The patient voiced understanding of the aforementioned assessment and plan. Summary of plan was provided in the After Visit Summary patient instructions. A total of 42 minutes was spent on this visit, reviewing interval notes, interval testing results, ordering tests, refilling medications, documenting the findings in the note, patient education, counseling, and coordination of care as described above. All questions asked and answered.     TODAY'S ORDERS    Orders Placed This Encounter    methotrexate (RHEUMATREX) 2.5 mg tablet    folic acid (FOLVITE) 1 mg tablet     Future Appointments   Date Time Provider Michelle [place]   8/31/2021  9:40 AM Sugey Diehl MD AO[facility] BS AMB     Tri Reveles MD, [address] [place] [address]    Adult Rheumatology   Rheumatology Ultrasound Certified  Ronald Najera  A Part of [address]   Phone [phone]  Fax [phone]

## 2021-05-28 DIAGNOSIS — M05.79 SEROPOSITIVE RHEUMATOID ARTHRITIS OF MULTIPLE SITES (HCC): ICD-10-CM

## 2021-06-14 ENCOUNTER — TELEPHONE (OUTPATIENT)
Dept: RHEUMATOLOGY | Age: 46
End: 2021-06-14

## 2021-06-14 NOTE — TELEPHONE ENCOUNTER
----- Message from [facility] sent at 6/14/2021 10:18 AM EDT -----  Regarding: Dr. Dulce Rodriguez first and last name: Self     Reason for call: r/x change    Callback required yes/no and why: Yes, to discuss medication change    Best contact number(s): [phone]    Details to clarify the request: Pt is continuing to have pain and would like to discuss changing his medication. Pt stated that Dr. Emilio Cotto suggested an injection, and pt would like to talk about that.

## 2021-06-14 NOTE — TELEPHONE ENCOUNTER
Spoke with pt who stated that he was having more pain and that he had not heard back about the new medication that Dr. Alfred Beverly prescribed for him. Dr. Alfred Beverly prescribed infliximab infusions and he has not received a call to set them up. A new order was faxed to [facility] for 5mg/kg every 8 weeks. I asked him to call us back if he does not receive a call to schedule by Wed. The number was also provided for him to call and schedule if he does not receive a call. He stated an understanding.

## 2021-06-15 DIAGNOSIS — M05.79 SEROPOSITIVE RHEUMATOID ARTHRITIS OF MULTIPLE SITES (HCC): Primary | ICD-10-CM

## 2021-06-15 RX ORDER — SODIUM CHLORIDE 9 MG/ML
25 INJECTION, SOLUTION INTRAVENOUS CONTINUOUS
Status: CANCELLED | OUTPATIENT
Start: 2021-06-21 | End: 2021-06-21

## 2021-06-15 RX ORDER — DIPHENHYDRAMINE HYDROCHLORIDE 50 MG/ML
25 INJECTION, SOLUTION INTRAMUSCULAR; INTRAVENOUS AS NEEDED
Status: CANCELLED
Start: 2021-06-21

## 2021-06-15 RX ORDER — SODIUM CHLORIDE 9 MG/ML
10 INJECTION INTRAMUSCULAR; INTRAVENOUS; SUBCUTANEOUS AS NEEDED
Status: CANCELLED | OUTPATIENT
Start: 2021-06-21

## 2021-06-15 RX ORDER — HYDROCORTISONE SODIUM SUCCINATE 100 MG/2ML
100 INJECTION, POWDER, FOR SOLUTION INTRAMUSCULAR; INTRAVENOUS AS NEEDED
Status: CANCELLED | OUTPATIENT
Start: 2021-06-21

## 2021-06-15 RX ORDER — EPINEPHRINE 1 MG/ML
0.3 INJECTION, SOLUTION, CONCENTRATE INTRAVENOUS AS NEEDED
Status: CANCELLED | OUTPATIENT
Start: 2021-06-21

## 2021-06-15 RX ORDER — HEPARIN 100 UNIT/ML
300-500 SYRINGE INTRAVENOUS AS NEEDED
Status: CANCELLED
Start: 2021-06-21

## 2021-06-15 RX ORDER — ACETAMINOPHEN 325 MG/1
650 TABLET ORAL AS NEEDED
Status: CANCELLED
Start: 2021-06-21

## 2021-06-15 RX ORDER — ACETAMINOPHEN 325 MG/1
650 TABLET ORAL ONCE
Status: CANCELLED
Start: 2021-06-21 | End: 2021-06-21

## 2021-06-15 RX ORDER — SODIUM CHLORIDE 9 MG/ML
25 INJECTION, SOLUTION INTRAVENOUS CONTINUOUS
Status: CANCELLED | OUTPATIENT
Start: 2021-06-21

## 2021-06-15 RX ORDER — DIPHENHYDRAMINE HCL 25 MG
50 CAPSULE ORAL ONCE
Status: CANCELLED | OUTPATIENT
Start: 2021-06-21 | End: 2021-06-21

## 2021-06-15 RX ORDER — ALBUTEROL SULFATE 0.83 MG/ML
2.5 SOLUTION RESPIRATORY (INHALATION) AS NEEDED
Status: CANCELLED
Start: 2021-06-21

## 2021-06-15 RX ORDER — DIPHENHYDRAMINE HYDROCHLORIDE 50 MG/ML
50 INJECTION, SOLUTION INTRAMUSCULAR; INTRAVENOUS AS NEEDED
Status: CANCELLED
Start: 2021-06-21

## 2021-06-15 RX ORDER — ONDANSETRON 2 MG/ML
8 INJECTION INTRAMUSCULAR; INTRAVENOUS AS NEEDED
Status: CANCELLED | OUTPATIENT
Start: 2021-06-21

## 2021-06-15 RX ORDER — SODIUM CHLORIDE 0.9 % (FLUSH) 0.9 %
10 SYRINGE (ML) INJECTION AS NEEDED
Status: CANCELLED | OUTPATIENT
Start: 2021-06-21 | End: 2021-06-21

## 2021-06-21 ENCOUNTER — HOSPITAL ENCOUNTER (OUTPATIENT)
Dept: INFUSION THERAPY | Age: 46
Discharge: HOME | End: 2021-06-21
Payer: SUBSIDIZED

## 2021-06-21 VITALS
RESPIRATION RATE: 16 BRPM | OXYGEN SATURATION: 99 % | SYSTOLIC BLOOD PRESSURE: 103 MMHG | TEMPERATURE: 97.9 F | HEART RATE: 56 BPM | DIASTOLIC BLOOD PRESSURE: 83 MMHG

## 2021-06-21 DIAGNOSIS — M05.79 SEROPOSITIVE RHEUMATOID ARTHRITIS OF MULTIPLE SITES (HCC): Primary | ICD-10-CM

## 2021-06-21 DIAGNOSIS — M05.79 SEROPOSITIVE RHEUMATOID ARTHRITIS OF MULTIPLE SITES (HCC): ICD-10-CM

## 2021-06-21 LAB
ALBUMIN SERPL-MCNC: 3.4 G/DL (ref 3.5–5)
ALBUMIN/GLOB SERPL: 0.8 {RATIO} (ref 1.1–2.2)
ALP SERPL-CCNC: 56 U/L (ref 45–117)
ALT SERPL-CCNC: 66 U/L (ref 12–78)
ANION GAP SERPL CALC-SCNC: 8 MMOL/L (ref 5–15)
AST SERPL-CCNC: 36 U/L (ref 15–37)
BASOPHILS # BLD: 0 K/UL (ref 0–0.1)
BASOPHILS NFR BLD: 1 % (ref 0–1)
BILIRUB SERPL-MCNC: 1.2 MG/DL (ref 0.2–1)
BUN SERPL-MCNC: 18 MG/DL (ref 6–20)
BUN/CREAT SERPL: 24 (ref 12–20)
CALCIUM SERPL-MCNC: 8.3 MG/DL (ref 8.5–10.1)
CHLORIDE SERPL-SCNC: 105 MMOL/L (ref 97–108)
CO2 SERPL-SCNC: 27 MMOL/L (ref 21–32)
CREAT SERPL-MCNC: 0.76 MG/DL (ref 0.7–1.3)
CRP SERPL-MCNC: 0.57 MG/DL (ref 0–0.6)
DIFFERENTIAL METHOD BLD: NORMAL
EOSINOPHIL # BLD: 0.1 K/UL (ref 0–0.4)
EOSINOPHIL NFR BLD: 3 % (ref 0–7)
ERYTHROCYTE [DISTWIDTH] IN BLOOD BY AUTOMATED COUNT: 12.6 % (ref 11.5–14.5)
ERYTHROCYTE [SEDIMENTATION RATE] IN BLOOD: 59 MM/HR (ref 0–15)
GLOBULIN SER CALC-MCNC: 4.1 G/DL (ref 2–4)
GLUCOSE SERPL-MCNC: 98 MG/DL (ref 65–100)
HCT VFR BLD AUTO: 41 % (ref 36.6–50.3)
HGB BLD-MCNC: 14 G/DL (ref 12.1–17)
IMM GRANULOCYTES # BLD AUTO: 0 K/UL (ref 0–0.04)
IMM GRANULOCYTES NFR BLD AUTO: 0 % (ref 0–0.5)
LYMPHOCYTES # BLD: 1.2 K/UL (ref 0.8–3.5)
LYMPHOCYTES NFR BLD: 25 % (ref 12–49)
MCH RBC QN AUTO: 29.5 PG (ref 26–34)
MCHC RBC AUTO-ENTMCNC: 34.1 G/DL (ref 30–36.5)
MCV RBC AUTO: 86.3 FL (ref 80–99)
MONOCYTES # BLD: 0.6 K/UL (ref 0–1)
MONOCYTES NFR BLD: 13 % (ref 5–13)
NEUTS SEG # BLD: 2.8 K/UL (ref 1.8–8)
NEUTS SEG NFR BLD: 58 % (ref 32–75)
NRBC # BLD: 0 K/UL (ref 0–0.01)
NRBC BLD-RTO: 0 PER 100 WBC
PLATELET # BLD AUTO: 262 K/UL (ref 150–400)
PMV BLD AUTO: 9.5 FL (ref 8.9–12.9)
POTASSIUM SERPL-SCNC: 3.8 MMOL/L (ref 3.5–5.1)
PROT SERPL-MCNC: 7.5 G/DL (ref 6.4–8.2)
RBC # BLD AUTO: 4.75 M/UL (ref 4.1–5.7)
SODIUM SERPL-SCNC: 140 MMOL/L (ref 136–145)
WBC # BLD AUTO: 4.8 K/UL (ref 4.1–11.1)

## 2021-06-21 PROCEDURE — 74011250637 HC RX REV CODE- 250/637: Performed by: INTERNAL MEDICINE

## 2021-06-21 PROCEDURE — 85025 COMPLETE CBC W/AUTO DIFF WBC: CPT

## 2021-06-21 PROCEDURE — 74011250636 HC RX REV CODE- 250/636: Performed by: INTERNAL MEDICINE

## 2021-06-21 PROCEDURE — 86140 C-REACTIVE PROTEIN: CPT

## 2021-06-21 PROCEDURE — 36415 COLL VENOUS BLD VENIPUNCTURE: CPT

## 2021-06-21 PROCEDURE — 80053 COMPREHEN METABOLIC PANEL: CPT

## 2021-06-21 PROCEDURE — 85652 RBC SED RATE AUTOMATED: CPT

## 2021-06-21 PROCEDURE — 96366 THER/PROPH/DIAG IV INF ADDON: CPT

## 2021-06-21 PROCEDURE — 96365 THER/PROPH/DIAG IV INF INIT: CPT

## 2021-06-21 RX ORDER — DIPHENHYDRAMINE HCL 25 MG
50 CAPSULE ORAL ONCE
Status: CANCELLED | OUTPATIENT
Start: 2021-07-05 | End: 2021-07-05

## 2021-06-21 RX ORDER — DIPHENHYDRAMINE HYDROCHLORIDE 50 MG/ML
50 INJECTION, SOLUTION INTRAMUSCULAR; INTRAVENOUS AS NEEDED
Status: CANCELLED
Start: 2021-07-05

## 2021-06-21 RX ORDER — SODIUM CHLORIDE 0.9 % (FLUSH) 0.9 %
10 SYRINGE (ML) INJECTION AS NEEDED
Status: CANCELLED | OUTPATIENT
Start: 2021-07-07 | End: 2021-07-07

## 2021-06-21 RX ORDER — HYDROCORTISONE SODIUM SUCCINATE 100 MG/2ML
100 INJECTION, POWDER, FOR SOLUTION INTRAMUSCULAR; INTRAVENOUS AS NEEDED
Status: CANCELLED | OUTPATIENT
Start: 2021-07-05

## 2021-06-21 RX ORDER — SODIUM CHLORIDE 9 MG/ML
25 INJECTION, SOLUTION INTRAVENOUS CONTINUOUS
Status: DISPENSED | OUTPATIENT
Start: 2021-06-21 | End: 2021-06-21

## 2021-06-21 RX ORDER — SODIUM CHLORIDE 9 MG/ML
25 INJECTION, SOLUTION INTRAVENOUS CONTINUOUS
Status: CANCELLED | OUTPATIENT
Start: 2021-07-07 | End: 2021-07-07

## 2021-06-21 RX ORDER — SODIUM CHLORIDE 9 MG/ML
25 INJECTION, SOLUTION INTRAVENOUS CONTINUOUS
Status: CANCELLED | OUTPATIENT
Start: 2021-07-05

## 2021-06-21 RX ORDER — ACETAMINOPHEN 325 MG/1
650 TABLET ORAL ONCE
Status: COMPLETED | OUTPATIENT
Start: 2021-06-21 | End: 2021-06-21

## 2021-06-21 RX ORDER — DIPHENHYDRAMINE HCL 25 MG
50 CAPSULE ORAL ONCE
Status: COMPLETED | OUTPATIENT
Start: 2021-06-21 | End: 2021-06-21

## 2021-06-21 RX ORDER — SODIUM CHLORIDE 9 MG/ML
10 INJECTION INTRAMUSCULAR; INTRAVENOUS; SUBCUTANEOUS AS NEEDED
Status: CANCELLED | OUTPATIENT
Start: 2021-07-05

## 2021-06-21 RX ORDER — ACETAMINOPHEN 325 MG/1
650 TABLET ORAL ONCE
Status: CANCELLED
Start: 2021-07-05 | End: 2021-07-05

## 2021-06-21 RX ORDER — ALBUTEROL SULFATE 0.83 MG/ML
2.5 SOLUTION RESPIRATORY (INHALATION) AS NEEDED
Status: CANCELLED
Start: 2021-07-05

## 2021-06-21 RX ORDER — DIPHENHYDRAMINE HYDROCHLORIDE 50 MG/ML
25 INJECTION, SOLUTION INTRAMUSCULAR; INTRAVENOUS AS NEEDED
Status: CANCELLED
Start: 2021-07-05

## 2021-06-21 RX ORDER — ACETAMINOPHEN 325 MG/1
650 TABLET ORAL AS NEEDED
Status: CANCELLED
Start: 2021-07-05

## 2021-06-21 RX ORDER — HEPARIN 100 UNIT/ML
300-500 SYRINGE INTRAVENOUS AS NEEDED
Status: CANCELLED
Start: 2021-07-05

## 2021-06-21 RX ORDER — ONDANSETRON 2 MG/ML
8 INJECTION INTRAMUSCULAR; INTRAVENOUS AS NEEDED
Status: CANCELLED | OUTPATIENT
Start: 2021-07-05

## 2021-06-21 RX ORDER — EPINEPHRINE 1 MG/ML
0.3 INJECTION, SOLUTION, CONCENTRATE INTRAVENOUS AS NEEDED
Status: CANCELLED | OUTPATIENT
Start: 2021-07-05

## 2021-06-21 RX ADMIN — SODIUM CHLORIDE 300 MG: 9 INJECTION, SOLUTION INTRAVENOUS at 10:15

## 2021-06-21 RX ADMIN — DIPHENHYDRAMINE HYDROCHLORIDE 50 MG: 25 CAPSULE ORAL at 09:46

## 2021-06-21 RX ADMIN — METHYLPREDNISOLONE SODIUM SUCCINATE 40 MG: 40 INJECTION, POWDER, FOR SOLUTION INTRAMUSCULAR; INTRAVENOUS at 09:49

## 2021-06-21 RX ADMIN — ACETAMINOPHEN 650 MG: 325 TABLET ORAL at 09:46

## 2021-06-21 RX ADMIN — SODIUM CHLORIDE 25 ML/HR: 900 INJECTION, SOLUTION INTRAVENOUS at 09:44

## 2021-06-21 NOTE — PROGRESS NOTES
[facility] Progress Note                                 Date: June 21, 2021       Treatment: Renflexis      Mr. Monae Urrutia arrived in no acute distress at 0905. Patient COVID Screening completed:  Do you have any symptoms of COVID-19? SOB, coughing, fever, or generally not feeling well? NO  Have you been exposed to COVID-19 recently? NO  Have you had any recent contact with family/friend that has a pending COVID test? NO    Assessment was unremarkable with the exception of 5/10 L wrist pain. No other concerns voiced. 24G PIV established in R A/C with positive blood return noted. Problem: Knowledge Deficit  Goal: *Verbalizes understanding of procedures and medications  Outcome: Progressing Towards Goal     Problem: Patient Education:  Go to Education Activity  Goal: Patient/Family Education  Outcome: Progressing Towards Goal    Mr. Andrews Govea's vitals for today's visit. Patient Vitals for the past 12 hrs:   Temp Pulse Resp BP SpO2   06/21/21 1237 -- (!) 56 -- 103/83 --   06/21/21 1130 -- (!) 55 -- 108/72 --   06/21/21 1115 -- (!) 52 -- 103/72 --   06/21/21 1100 -- (!) 54 -- 109/70 --   06/21/21 1045 -- 61 -- 115/75 --   06/21/21 1030 -- 63 16 123/77 --   06/21/21 0907 97.9 °F (36.6 °C) 65 18 117/65 99 %       Lab results:  Recent Results (from the past 12 hour(s))   CBC WITH AUTOMATED DIFF    Collection Time: 06/21/21  9:19 AM   Result Value Ref Range    WBC 4.8 4.1 - 11.1 K/uL    RBC 4.75 4.10 - 5.70 M/uL    HGB 14.0 12.1 - 17.0 g/dL    HCT 41.0 36.6 - 50.3 %    MCV 86.3 80.0 - 99.0 FL    MCH 29.5 26.0 - 34.0 PG    MCHC 34.1 30.0 - 36.5 g/dL    RDW 12.6 11.5 - 14.5 %    PLATELET 495 866 - 274 K/uL    MPV 9.5 8.9 - 12.9 FL    NRBC 0.0 0  WBC    ABSOLUTE NRBC 0.00 0.00 - 0.01 K/uL    NEUTROPHILS 58 32 - 75 %    LYMPHOCYTES 25 12 - 49 %    MONOCYTES 13 5 - 13 %    EOSINOPHILS 3 0 - 7 %    BASOPHILS 1 0 - 1 %    IMMATURE GRANULOCYTES 0 0.0 - 0.5 %    ABS. NEUTROPHILS 2.8 1.8 - 8.0 K/UL    ABS. LYMPHOCYTES 1.2 0.8 - 3.5 K/UL    ABS. MONOCYTES 0.6 0.0 - 1.0 K/UL    ABS. EOSINOPHILS 0.1 0.0 - 0.4 K/UL    ABS. BASOPHILS 0.0 0.0 - 0.1 K/UL    ABS. IMM. GRANS. 0.0 0.00 - 0.04 K/UL    DF AUTOMATED     METABOLIC PANEL, COMPREHENSIVE    Collection Time: 06/21/21  9:19 AM   Result Value Ref Range    Sodium 140 136 - 145 mmol/L    Potassium 3.8 3.5 - 5.1 mmol/L    Chloride 105 97 - 108 mmol/L    CO2 27 21 - 32 mmol/L    Anion gap 8 5 - 15 mmol/L    Glucose 98 65 - 100 mg/dL    BUN 18 6 - 20 MG/DL    Creatinine 0.76 0.70 - 1.30 MG/DL    BUN/Creatinine ratio 24 (H) 12 - 20      GFR est AA >60 >60 ml/min/1.73m2    GFR est non-AA >60 >60 ml/min/1.73m2    Calcium 8.3 (L) 8.5 - 10.1 MG/DL    Bilirubin, total 1.2 (H) 0.2 - 1.0 MG/DL    ALT (SGPT) 66 12 - 78 U/L    AST (SGOT) 36 15 - 37 U/L    Alk.  phosphatase 56 45 - 117 U/L    Protein, total 7.5 6.4 - 8.2 g/dL    Albumin 3.4 (L) 3.5 - 5.0 g/dL    Globulin 4.1 (H) 2.0 - 4.0 g/dL    A-G Ratio 0.8 (L) 1.1 - 2.2     SED RATE (ESR)    Collection Time: 06/21/21  9:19 AM   Result Value Ref Range    Sed rate, automated 59 (H) 0 - 15 mm/hr       Medications given:  Medications Administered       0.9% sodium chloride infusion       Admin Date  06/21/2021 Action  New Bag Dose  25 mL/hr Rate  25 mL/hr Route  IntraVENous Administered By  Jeanna Hayes RN              acetaminophen (TYLENOL) tablet 650 mg       Admin Date  06/21/2021 Action  Given Dose  650 mg Route  Oral Administered By  Jeanna Hayes RN              diphenhydrAMINE (BENADRYL) capsule 50 mg       Admin Date  06/21/2021 Action  Given Dose  50 mg Route  Oral Administered By  Jeanna Hayes RN              inFLIXimab-abda (RENFLEXIS) 300 mg in 0.9% sodium chloride 250 mL, overfill volume 25 mL infusion       Admin Date  06/21/2021 Action  New Bag Dose  300 mg Route  IntraVENous Administered By  Sonido Ramsey RN              methylPREDNISolone (PF) (SOLU-MEDROL) injection 40 mg       Admin Date  06/21/2021 Action  Given Dose  40 mg Route  IntraVENous Administered By  Zara López RN                    Mr. Varun Tinajero tolerated the treatment without complaints. PIV flushed and removed, 2x2 and coban placed. Mr. Varun Tinajero was discharged from [place] [address] in stable condition at 1240.      Future Appointments   Date Time Provider Michelle Ho   7/7/2021  8:00  [facility]   8/2/2021  8:00 AM [facility] INFUSION NURSE 1 [facility]   8/31/2021  9:40 AM Viv Cabrera MD [facility] BS IRISH Amaral RN  June 21, 2021  12:52 PM

## 2021-06-21 NOTE — DISCHARGE INSTRUCTIONS
OUTPATIENT INFUSION CENTER                                                             GENERAL DISCHARGE INSTRUCTIONS           1. If you have any signs or symptoms of infection, or you have recently been diagnosed and treated for an infection, contact your physician prior to receiving Renflexis. 2. Contact your doctor right away if you have signs of infection, such as fever, chills, sore throat, flu symptoms. 3. Contact your doctor right away if you have easy bruising or bleeding, unusually pale skin, or unusual weakness. 4. Check with your physician before receiving a \"live\" vaccine during therapy due to a possible decrease in ability to fight infections. 5. Avoid contact with individuals with known infections. 6. Wash hands frequently. 7. All medications can potentially cause side effects. Possible general side effects may include    · Mild abdominal pain, fatigue or headache;  ·  Stuffy nose, sinus pain or mild skin rash;  ·  Mild joint pain or swelling, malaise/fatigue;  ·  Mild hair loss. 6.  Signs/Symptoms of an allergic reaction may require immediate medical attention:     ·  Skin - Redness, itching, swelling, blistering, weeping, crusting, rash,   eruptions, or hives;  ·  Lungs - Wheezing, cough, or shortness of breath;  ·  Cardiac - changes in blood pressure, swelling of extremities,   unexplained weakness, chest pain or tightness;  ·  Swelling of the face, eyelids, lips, tongue, or throat;  severe headache;  ·  Stuffy nose, runny nose (clear, thin discharge), sneezing;   ·  Red (bloodshot), itchy, swollen, or watery eyes;  ·  Stomach  pain, nausea, vomiting, or bloody diarrhea. Contact your physician's office with questions or concerns or if you experience any of the above symptoms.                   Jung Killian, Jade 6/21/2021  Pat Sherman RN

## 2021-07-05 DIAGNOSIS — M05.79 SEROPOSITIVE RHEUMATOID ARTHRITIS OF MULTIPLE SITES (HCC): ICD-10-CM

## 2021-07-07 ENCOUNTER — HOSPITAL ENCOUNTER (OUTPATIENT)
Dept: INFUSION THERAPY | Age: 46
Discharge: HOME | End: 2021-07-07
Payer: SUBSIDIZED

## 2021-07-07 ENCOUNTER — TELEPHONE (OUTPATIENT)
Dept: RHEUMATOLOGY | Age: 46
End: 2021-07-07

## 2021-07-07 VITALS
HEIGHT: 65 IN | HEART RATE: 60 BPM | SYSTOLIC BLOOD PRESSURE: 98 MMHG | TEMPERATURE: 97.8 F | OXYGEN SATURATION: 100 % | RESPIRATION RATE: 16 BRPM | BODY MASS INDEX: 24.83 KG/M2 | WEIGHT: 149 LBS | DIASTOLIC BLOOD PRESSURE: 60 MMHG

## 2021-07-07 DIAGNOSIS — M05.79 SEROPOSITIVE RHEUMATOID ARTHRITIS OF MULTIPLE SITES (HCC): Primary | ICD-10-CM

## 2021-07-07 PROCEDURE — 96365 THER/PROPH/DIAG IV INF INIT: CPT

## 2021-07-07 PROCEDURE — 74011250637 HC RX REV CODE- 250/637: Performed by: INTERNAL MEDICINE

## 2021-07-07 PROCEDURE — 96366 THER/PROPH/DIAG IV INF ADDON: CPT

## 2021-07-07 PROCEDURE — 74011250636 HC RX REV CODE- 250/636: Performed by: INTERNAL MEDICINE

## 2021-07-07 RX ORDER — SODIUM CHLORIDE 9 MG/ML
25 INJECTION, SOLUTION INTRAVENOUS CONTINUOUS
Status: DISPENSED | OUTPATIENT
Start: 2021-07-07 | End: 2021-07-07

## 2021-07-07 RX ORDER — SODIUM CHLORIDE 9 MG/ML
25 INJECTION, SOLUTION INTRAVENOUS CONTINUOUS
Status: CANCELLED | OUTPATIENT
Start: 2021-08-02 | End: 2021-08-02

## 2021-07-07 RX ORDER — SODIUM CHLORIDE 0.9 % (FLUSH) 0.9 %
10 SYRINGE (ML) INJECTION AS NEEDED
Status: CANCELLED | OUTPATIENT
Start: 2021-08-02 | End: 2021-08-02

## 2021-07-07 RX ORDER — EPINEPHRINE 1 MG/ML
0.3 INJECTION, SOLUTION, CONCENTRATE INTRAVENOUS AS NEEDED
Status: CANCELLED | OUTPATIENT
Start: 2021-08-02

## 2021-07-07 RX ORDER — ACETAMINOPHEN 325 MG/1
650 TABLET ORAL ONCE
Status: CANCELLED
Start: 2021-08-02 | End: 2021-08-02

## 2021-07-07 RX ORDER — DIPHENHYDRAMINE HCL 25 MG
50 CAPSULE ORAL ONCE
Status: CANCELLED | OUTPATIENT
Start: 2021-08-02 | End: 2021-08-02

## 2021-07-07 RX ORDER — HYDROCORTISONE SODIUM SUCCINATE 100 MG/2ML
100 INJECTION, POWDER, FOR SOLUTION INTRAMUSCULAR; INTRAVENOUS AS NEEDED
Status: CANCELLED | OUTPATIENT
Start: 2021-08-02

## 2021-07-07 RX ORDER — ACETAMINOPHEN 325 MG/1
650 TABLET ORAL ONCE
Status: COMPLETED | OUTPATIENT
Start: 2021-07-07 | End: 2021-07-07

## 2021-07-07 RX ORDER — SODIUM CHLORIDE 9 MG/ML
25 INJECTION, SOLUTION INTRAVENOUS CONTINUOUS
Status: CANCELLED | OUTPATIENT
Start: 2021-08-02

## 2021-07-07 RX ORDER — ACETAMINOPHEN 325 MG/1
650 TABLET ORAL AS NEEDED
Status: CANCELLED
Start: 2021-08-02

## 2021-07-07 RX ORDER — ONDANSETRON 2 MG/ML
8 INJECTION INTRAMUSCULAR; INTRAVENOUS AS NEEDED
Status: CANCELLED | OUTPATIENT
Start: 2021-08-02

## 2021-07-07 RX ORDER — DIPHENHYDRAMINE HYDROCHLORIDE 50 MG/ML
50 INJECTION, SOLUTION INTRAMUSCULAR; INTRAVENOUS AS NEEDED
Status: CANCELLED
Start: 2021-08-02

## 2021-07-07 RX ORDER — DIPHENHYDRAMINE HYDROCHLORIDE 50 MG/ML
25 INJECTION, SOLUTION INTRAMUSCULAR; INTRAVENOUS AS NEEDED
Status: CANCELLED
Start: 2021-08-02

## 2021-07-07 RX ORDER — SODIUM CHLORIDE 9 MG/ML
10 INJECTION INTRAMUSCULAR; INTRAVENOUS; SUBCUTANEOUS AS NEEDED
Status: CANCELLED | OUTPATIENT
Start: 2021-08-02

## 2021-07-07 RX ORDER — ALBUTEROL SULFATE 0.83 MG/ML
2.5 SOLUTION RESPIRATORY (INHALATION) AS NEEDED
Status: CANCELLED
Start: 2021-08-02

## 2021-07-07 RX ORDER — DIPHENHYDRAMINE HCL 25 MG
50 CAPSULE ORAL ONCE
Status: ACTIVE | OUTPATIENT
Start: 2021-07-07 | End: 2021-07-07

## 2021-07-07 RX ORDER — HEPARIN 100 UNIT/ML
300-500 SYRINGE INTRAVENOUS AS NEEDED
Status: CANCELLED
Start: 2021-08-02

## 2021-07-07 RX ADMIN — SODIUM CHLORIDE 300 MG: 9 INJECTION, SOLUTION INTRAVENOUS at 08:39

## 2021-07-07 RX ADMIN — SODIUM CHLORIDE 25 ML/HR: 900 INJECTION, SOLUTION INTRAVENOUS at 08:11

## 2021-07-07 RX ADMIN — METHYLPREDNISOLONE SODIUM SUCCINATE 40 MG: 40 INJECTION, POWDER, FOR SOLUTION INTRAMUSCULAR; INTRAVENOUS at 08:15

## 2021-07-07 RX ADMIN — ACETAMINOPHEN 650 MG: 325 TABLET ORAL at 08:11

## 2021-07-07 NOTE — DISCHARGE INSTRUCTIONS
Patient Education        Artritis reumatoide: Instrucciones de cuidado  Rheumatoid Arthritis: Care Instructions  Instrucciones de cuidado    La artritis es un problema común de augustina que se caracteriza por la inflamación de las articulaciones. Hay muchos tipos de artritis. En la artritis reumatoide, el propio sistema inmunitario del organismo ataca las articulaciones. [place] causa dolor, rigidez e hinchazón en las articulaciones, sobre todo en las bina y los pies. Puede dificultarle abrir frascos, escribir y [address] diarias. A veces la artritis reumatoide también puede causar bultos debajo de la piel. Con el tiempo la artritis reumatoide puede dañar y deformar las articulaciones. El tratamiento temprano con medicamentos podría reducir russ probabilidades de tener akosua discapacidad prolongada. La atención de seguimiento es akosua parte clave de guzman tratamiento y seguridad. Asegúrese de hacer y acudir a todas las citas, y llame a guzman médico si está teniendo problemas. También es akosua buena idea saber los resultados de russ exámenes y mantener akosua lista de los medicamentos que nain. ¿Cómo puede cuidarse en el hogar? · [place] más ejercicios si guzman médico se lo recomienda. Caminar es akosua buena opción. Si le duelen las rodillas o los tobillos, trate de montar en bicicleta estática o nadar. · Mueva cada articulación de forma suave en toda guzman amplitud de movimiento akosua o dos veces al día. · Descanse las articulaciones cuando estén adoloridas o hayan hecho mucho esfuerzo. Los descansos cortos podrían ayudar más que quedarse en cama. · [place] y [place] un peso saludable. El ejercicio hecho regularmente y akosua dieta saludable le ayudarán a lograrlo. El sobrepeso puede tensar las articulaciones, [place] 1978 y [de-identified], y empeorar el dolor. [place]aj[place] incluso unas pocas libras podría ayudar. · Consuma suficiente calcio y vitamina D para ayudar a prevenir la osteoporosis, que causa huesos débiles.  Hable con guzman médico acerca de qué cantidad debe joyce. · Proteja russ articulaciones de las lesiones. No las use en exceso. Trate de limitar o evitar actividades que causan dolor o hinchazón en las articulaciones. Si lo necesita, use utensilios de cocina especiales y otros aparatos para ayudarse, celestine andadores, tablillas (férulas) o bastones. · Use calor para aliviar el dolor. [place] akua o duchas tibias. Utilice compresas calientes o akosua almohadilla térmica ajustada a baja temperatura. [place] con Melvenia Gault cobija eléctrica ajustada a akosua temperatura media. · Aplíquese hielo o akosua compresa fría sobre la samira abdirahman 10 a 20 minutos cada vez. Póngase un paño holloway entre el hielo y la piel. · [employer] analgésicos (medicamentos para el dolor) exactamente según las indicaciones. ? Si el médico le recetó un analgésico, tómelo según las indicaciones. ? Si no está tomando un analgésico recetado, pregúntele a guzman médico si puede joyce [place] de [employer]. · Participe de Rehan Boyd en el manejo de guzman afección. [place] un plan de tratamiento con guzman médico y aprenda todo lo que pueda sobre la artritis reumatoide. [place] le ayudará a controlar el dolor y [place]. ¿Cuándo debe pedir ayuda? Llame a guzman médico ahora mismo o busque atención médica inmediata si:    · Tiene fiebre o salpullido junto con dolor en las articulaciones.     · Usted tiene un dolor en akosua articulación que es acevedo intenso que no puede usar dicha articulación.     · Tiene hinchazón, enrojecimiento o dolor repentinos en akosua o más articulaciones y no sabe por qué.     · Tiene dolor en la espalda o el windy, además de debilitamiento en los brazos o las piernas.     · Pierde el control de la vejiga o los intestinos.    Preste especial atención a los cambios en guzman augustina y asegúrese de comunicarse con guzman médico si:    · Tiene dolor en las articulaciones que dura más de 6 semanas.     · Tiene efectos secundarios por los medicamentos para la artritis, celestine dolor de estómago, náuseas, acidez gástrica o heces oscuras parecidas al alquitrán. ¿Dónde puede encontrar más información en inglés? Vaya a http://www.cleary.com/  Lisandra Garcia K205 en la búsqueda para aprender más acerca de \"Artritis reumatoide: Instrucciones de cuidado. \"  Revisado: 5 agosto, 2020               Versión del contenido: 12.8  © 2006-2021 Healthwise, Incorporated. Las instrucciones de cuidado fueron adaptadas bajo licencia por [employer] (which disclaims liability or warranty for this information). Si usted tiene [place] afección médica o sobre estas instrucciones, siempre pregunte a guzman profesional de augustina. Healthwise, Incorporated niega toda garantía o responsabilidad por guzman uso de esta información. Patient Education        Dieta para la artritis reumatoide: Instrucciones de cuidado  Rheumatoid Arthritis Diet: Care Instructions  Instrucciones de cuidado    La mejor dieta para personas con artritis reumatoide es akosua dieta faye y equilibrada que sea baja en grasas saturadas y abdiel, y maya en [place]  y carbohidratos complejos (granos integrales, frijoles, frutas y verduras). El aceite de pescado (ácidos grasos omega-3) tiene un efecto jordon en reducir la inflamación, y consumir pescado puede mejorar los síntomas. Las personas que tienen artritis reumatoide tienen un alto riesgo de desarrollar osteoporosis. Para ayudar a prevenir esta enfermedad, obtenga suficiente calcio y vitamina D. La atención de seguimiento es akosua parte clave de guzman tratamiento y seguridad. Asegúrese de hacer y acudir a todas las citas, y llame a guzman médico si está teniendo problemas. También es akosua buena idea saber los resultados de russ exámenes y mantener akosua lista de los medicamentos que nain. ¿Cómo puede cuidarse en el hogar? · Trate de comer por lo menos 2 porciones de pescado a la semana.  Los pescados grasosos, que contienen ácidos grasos omega-3, incluyen:  ? Atún. ? Salmón. ? [place]alla. ? Trucha de jalyn. ? Arenque. ? José Miguel. · Si usted está embarazada, hable con guzman médico sobre comer pescado. Las mujeres embarazadas no deben comer ciertos tipos de pescado que tienen alto contenido de pierce. · Usted puede obtener calcio y vitamina D bebiendo leche fortificada con vitamina D. Cuatro vasos de leche al día proporcionan alrededor de 1,200 miligramos (mg) de calcio. Otros alimentos comunes con calcio:  ? Yogur (normal o bajo en grasa). Elayne porción de 8 onzas provee 415 mg de calcio. ? Queso cheddar. Elayne porción de 1½ onzas provee 306 mg.  ? Leche (descremada, al 2% o entera). Elayne porción de 1 taza provee aproximadamente 300 mg.  ? Requesón (1% de grasa de [place]). Elayne porción de 1 taza provee 138 mg.  · Si no puede comer o beber productos lácteos, puede obtener calcio y vitamina D de:  ? Jugo de naranja fortificado con calcio. Elayne porción de 1 taza provee 500 mg de calcio. ? Leche de soya enriquecida con calcio. Elayne porción de 1 taza provee 282 mg de calcio. ? Almendras. Elayne porción de 1 onza (alrededor de 24 almendras) provee 75 mg de calcio. ? Salmón enlatado. Elayne porción de 3 onzas provee 180 mg de calcio. ? Tofu (firme, elaborado con sulfato de calcio). Elayne porción de ½ taza provee 204 mg.  · Es posible que deba joyce un suplemento de calcio para asegurarse de que está recibiendo el calcio que necesita. ¿Dónde puede encontrar más información en inglés? Vaya a http://www.gray.com/  [place] Q201 en la búsqueda para aprender más acerca de \"Dieta para la artritis reumatoide: Instrucciones de cuidado. \"  Revisado: 17 diciembre, 2020               Versión del contenido: 12.8  © 3096-6766 Healthwise, Incorporated. Las instrucciones de cuidado fueron adaptadas bajo licencia por [employer] (which disclaims liability or warranty for this information).  Si usted tiene [place] afección médica o sobre estas instrucciones, siempre pregunte a guzman profesional de augustina. [facility] niega toda garantía o responsabilidad por guzman uso de esta información. The following should be reported to your physician. If your physician is unavailable, call the hospital emergency department at [phone].    1. Rash  2. Difficulty breathing  3. Any elevation in temperature  4. Any further unusual symptoms    If you are discharged with a male adapter or any accessed port, keep site clean and dry. If any redness, swelling or pain develops at the insertion site, contact your physician.

## 2021-07-07 NOTE — PROGRESS NOTES
[facility] Progress Note                                 Date: 2021       Treatment: Renflexis      Mr. Jimenez Mcdonald arrived in no acute distress at [phone]    Patient COVID Screening completed:   Do you have any symptoms of COVID-19? SOB, coughing, fever, or generally not feeling well? NO   Have you been exposed to COVID-19 recently? NO   Have you had any recent contact with family/friend that has a pending COVID test? NO    Assessment was unremarkable with no new concerns voiced. 24G PIV established in R A/C with positive blood return noted. Problem: Knowledge Deficit  Goal: *Verbalizes understanding of procedures and medications  Outcome: Progressing Towards Goal     Problem: Patient Education:  Go to Education Activity  Goal: Patient/Family Education  Outcome: Progressing Towards Goal    Mr. Karyna Govea's vitals for today's visit. Patient Vitals for the past 12 hrs:   Temp Pulse Resp BP SpO2   21 1037 -- 60 -- 98/60 --   21 0758 97.8 °F (36.6 °C) 74 16 105/80 100 %       No labs today    Medications given:  Medications Administered     0.9% sodium chloride infusion     Admin Date  2021 Action  New Bag Dose  25 mL/hr Rate  25 mL/hr Route  IntraVENous Administered By  Katelyn Bahena RN          acetaminophen (TYLENOL) tablet 650 mg     Admin Date  2021 Action  Given Dose  650 mg Route  Oral Administered By  Katelyn Bahena RN          inFLIXimab-abda (RENFLEXIS) 300 mg in 0.9% sodium chloride 250 mL, overfill volume 25 mL infusion     Admin Date  2021 Action  New Bag Dose  300 mg Route  IntraVENous Administered By  Katelyn Bahena RN          methylPREDNISolone (PF) (SOLU-MEDROL) injection 40 mg     Admin Date  2021 Action  Given Dose  40 mg Route  IntraVENous Administered By  Katelyn Bahena RN                Mr. Jimenez Mcdonald tolerated the treatment without complaints. PIV flushed and removed, 2x2 and coban placed.     Mr. Jimenez Mcdonald was discharged from [facility] in stable condition at 1045.      Future Appointments   Date Time Provider Michelle Ho   8/2/2021  8:00 AM [facility] INFUSION NURSE 1 [facility]   8/31/2021  9:40 AM Guido Cranker, MD [facility] BS AMB       Geoff Leroy RN  July 7, 2021  8:32 AM

## 2021-08-02 ENCOUNTER — HOSPITAL ENCOUNTER (OUTPATIENT)
Dept: INFUSION THERAPY | Age: 46
Discharge: HOME | End: 2021-08-02
Payer: SUBSIDIZED

## 2021-08-02 VITALS
DIASTOLIC BLOOD PRESSURE: 66 MMHG | TEMPERATURE: 97.8 F | WEIGHT: 151.9 LBS | OXYGEN SATURATION: 100 % | RESPIRATION RATE: 16 BRPM | BODY MASS INDEX: 25.31 KG/M2 | SYSTOLIC BLOOD PRESSURE: 108 MMHG | HEIGHT: 65 IN | HEART RATE: 58 BPM

## 2021-08-02 DIAGNOSIS — M05.79 SEROPOSITIVE RHEUMATOID ARTHRITIS OF MULTIPLE SITES (HCC): Primary | ICD-10-CM

## 2021-08-02 DIAGNOSIS — M05.79 SEROPOSITIVE RHEUMATOID ARTHRITIS OF MULTIPLE SITES (HCC): ICD-10-CM

## 2021-08-02 PROCEDURE — 96375 TX/PRO/DX INJ NEW DRUG ADDON: CPT

## 2021-08-02 PROCEDURE — 96366 THER/PROPH/DIAG IV INF ADDON: CPT

## 2021-08-02 PROCEDURE — 74011250637 HC RX REV CODE- 250/637: Performed by: INTERNAL MEDICINE

## 2021-08-02 PROCEDURE — 96365 THER/PROPH/DIAG IV INF INIT: CPT

## 2021-08-02 PROCEDURE — 74011250636 HC RX REV CODE- 250/636: Performed by: INTERNAL MEDICINE

## 2021-08-02 RX ORDER — HEPARIN 100 UNIT/ML
300-500 SYRINGE INTRAVENOUS AS NEEDED
Status: CANCELLED
Start: 2021-09-27

## 2021-08-02 RX ORDER — SODIUM CHLORIDE 9 MG/ML
25 INJECTION, SOLUTION INTRAVENOUS CONTINUOUS
Status: DISPENSED | OUTPATIENT
Start: 2021-08-02 | End: 2021-08-02

## 2021-08-02 RX ORDER — DIPHENHYDRAMINE HYDROCHLORIDE 50 MG/ML
25 INJECTION, SOLUTION INTRAMUSCULAR; INTRAVENOUS AS NEEDED
Status: CANCELLED
Start: 2021-09-27

## 2021-08-02 RX ORDER — HYDROCORTISONE SODIUM SUCCINATE 100 MG/2ML
100 INJECTION, POWDER, FOR SOLUTION INTRAMUSCULAR; INTRAVENOUS AS NEEDED
Status: CANCELLED | OUTPATIENT
Start: 2021-09-27

## 2021-08-02 RX ORDER — SODIUM CHLORIDE 0.9 % (FLUSH) 0.9 %
10 SYRINGE (ML) INJECTION AS NEEDED
Status: CANCELLED | OUTPATIENT
Start: 2021-09-27 | End: 2021-09-27

## 2021-08-02 RX ORDER — EPINEPHRINE 1 MG/ML
0.3 INJECTION, SOLUTION, CONCENTRATE INTRAVENOUS AS NEEDED
Status: CANCELLED | OUTPATIENT
Start: 2021-09-27

## 2021-08-02 RX ORDER — SODIUM CHLORIDE 9 MG/ML
25 INJECTION, SOLUTION INTRAVENOUS CONTINUOUS
Status: CANCELLED | OUTPATIENT
Start: 2021-09-27 | End: 2021-09-27

## 2021-08-02 RX ORDER — ACETAMINOPHEN 325 MG/1
650 TABLET ORAL ONCE
Status: CANCELLED
Start: 2021-09-27 | End: 2021-09-27

## 2021-08-02 RX ORDER — DIPHENHYDRAMINE HCL 25 MG
50 CAPSULE ORAL ONCE
Status: CANCELLED | OUTPATIENT
Start: 2021-09-27 | End: 2021-09-27

## 2021-08-02 RX ORDER — DIPHENHYDRAMINE HCL 25 MG
50 CAPSULE ORAL ONCE
Status: ACTIVE | OUTPATIENT
Start: 2021-08-02 | End: 2021-08-02

## 2021-08-02 RX ORDER — ACETAMINOPHEN 325 MG/1
650 TABLET ORAL AS NEEDED
Status: CANCELLED
Start: 2021-09-27

## 2021-08-02 RX ORDER — SODIUM CHLORIDE 9 MG/ML
25 INJECTION, SOLUTION INTRAVENOUS CONTINUOUS
Status: CANCELLED | OUTPATIENT
Start: 2021-09-27

## 2021-08-02 RX ORDER — ALBUTEROL SULFATE 0.83 MG/ML
2.5 SOLUTION RESPIRATORY (INHALATION) AS NEEDED
Status: CANCELLED
Start: 2021-09-27

## 2021-08-02 RX ORDER — ONDANSETRON 2 MG/ML
8 INJECTION INTRAMUSCULAR; INTRAVENOUS AS NEEDED
Status: CANCELLED | OUTPATIENT
Start: 2021-09-27

## 2021-08-02 RX ORDER — SODIUM CHLORIDE 9 MG/ML
10 INJECTION INTRAMUSCULAR; INTRAVENOUS; SUBCUTANEOUS AS NEEDED
Status: CANCELLED | OUTPATIENT
Start: 2021-09-27

## 2021-08-02 RX ORDER — ACETAMINOPHEN 325 MG/1
650 TABLET ORAL ONCE
Status: COMPLETED | OUTPATIENT
Start: 2021-08-02 | End: 2021-08-02

## 2021-08-02 RX ORDER — DIPHENHYDRAMINE HYDROCHLORIDE 50 MG/ML
50 INJECTION, SOLUTION INTRAMUSCULAR; INTRAVENOUS AS NEEDED
Status: CANCELLED
Start: 2021-09-27

## 2021-08-02 RX ADMIN — SODIUM CHLORIDE 300 MG: 9 INJECTION, SOLUTION INTRAVENOUS at 09:07

## 2021-08-02 RX ADMIN — SODIUM CHLORIDE 25 ML/HR: 900 INJECTION, SOLUTION INTRAVENOUS at 08:17

## 2021-08-02 RX ADMIN — ACETAMINOPHEN 650 MG: 325 TABLET ORAL at 08:25

## 2021-08-02 RX ADMIN — METHYLPREDNISOLONE SODIUM SUCCINATE 40 MG: 40 INJECTION, POWDER, FOR SOLUTION INTRAMUSCULAR; INTRAVENOUS at 08:27

## 2021-08-02 NOTE — DISCHARGE INSTRUCTIONS
OUTPATIENT INFUSION CENTER    DISCHARGE INSTRUCTIONS FOR:  REMICADE (INFLIXIMAB):    1. Remicade may lower your ability to fight infections. If you have any signs or symptoms of infection, or you have recently been diagnosed and treated for an infection, contact your physician prior to receiving Remicade. 2.  Serious infections may occur during treatment with Remicade. Contact your doctor right away if you have signs of infection, such as fever, chills, sore throat, flu symptoms, easy bruising or bleeding, unusually pale skin, or unusual weakness. 3.  Check with your physician before receiving a live vaccine during therapy due to a possible decrease in ability to fight infections. 4.  Avoid contact with individuals with known infections. Wash hands frequently. The incidence of Tuberculosis can be higher in people taking Remicade. Symptoms of T.B. can include night sweats, coughing up blood, chills, fevers and unintended weight loss. 5.  All medications can potentially cause side effects. Possible side effects of Remicade may include:    Mild abdominal pain, fatigue or headache;  Stuffy nose, sinus pain, mild skin rash or itching;  Mild joint pain or swelling, malaise/fatigue;  Mild hair loss. 6.  Signs/Symptoms of an allergic reaction may require immediate medical attention:    Skin redness, yellowing, itching, swelling, blistering, weeping, rash or; Wheezing, cough, chest pain or tightness, shortness of breath; Changes in blood pressure, swelling of extremeties, unexplained weakness; Fast, slow or irregular heartbeat;  Swelling of the face, eyelids, lips, tongue or throat, difficulty swallowing;  Severe or sudden headache, stiff neck, seizures, confusion;  Severe stuffy nose, runny nose, sneezing; increased thirst;   Red (bloodshot), itchy, swollen, or watery eyes;  Stomach pain, nausea, vomiting, bloody diarrhea, pale stools, red or black stools;   Change in how much or how often you urinate, painful urination;  Unusual bruising or bleeding, unusual tiredness or weakness;  Pain in your lower leg (calf); swelling in hands, ankles or feet. Contact your physicians office with questions or concerns or if you experience any of the above symptoms.     Mateusz Barragan, Signature: ________________________________ 8/2/2021  Kamaljit Ribeiro RN

## 2021-08-02 NOTE — PROGRESS NOTES
\[facility]\"" Progress Note                                 Date: August 2, 2021       Treatment: Renflexis      Mr. Darlene Becerril arrived in no acute distress at 0805. Patient COVID Screening completed:   Do you have any symptoms of COVID-19? SOB, coughing, fever, or generally not feeling well? NO   Have you been exposed to COVID-19 recently? NO   Have you had any recent contact with family/friend that has a pending COVID test? NO    Assessment was unremarkable with no new concerns voiced. 24G PIV established in R A/C with positive blood return noted. Problem: Knowledge Deficit  Goal: *Verbalizes understanding of procedures and medications  Outcome: Progressing Towards Goal     Problem: Patient Education:  Go to Education Activity  Goal: Patient/Family Education  Outcome: Progressing Towards Goal    Mr. Jenni Govea's vitals for today's visit. Patient Vitals for the past 12 hrs:   Temp Pulse Resp BP SpO2   08/02/21 1104 -- (!) 58 16 108/66 --   08/02/21 0805 97.8 °F (36.6 °C) 65 16 (!) 96/52 100 %       No labs today    Medications given:  Medications Administered     0.9% sodium chloride infusion     Admin Date  08/02/2021 Action  New Bag Dose  25 mL/hr Rate  25 mL/hr Route  IntraVENous Administered By  Chet Delaney RN          acetaminophen (TYLENOL) tablet 650 mg     Admin Date  08/02/2021 Action  Given Dose  650 mg Route  Oral Administered By  Chet Delaney RN          inFLIXimab-abda (RENFLEXIS) 300 mg in 0.9% sodium chloride 250 mL, overfill volume 25 mL infusion     Admin Date  08/02/2021 Action  New Bag Dose  300 mg Route  IntraVENous Administered By  Chet Delaney RN          methylPREDNISolone (PF) (SOLU-MEDROL) injection 40 mg     Admin Date  08/02/2021 Action  Given Dose  40 mg Route  IntraVENous Administered By  Chet Delaney RN              Patient refused to take Benadryl    Mr. Darlene Becerril tolerated the treatment without complaints.  PIV flushed and removed, 2x2 and coban placed. Mr. Damion Angulo was discharged from [place] [address] in stable condition at 1115.      Future Appointments   Date Time Provider Michelle Ho   8/31/2021  9:40 AM Kiara Willis MD [facility] BS AMB   9/27/2021  8:00  [facility] INFUSION NURSE 1 [facility]       Keyshawn Lora RN  August 2, 2021  8:32 AM

## 2021-08-31 ENCOUNTER — OFFICE VISIT (OUTPATIENT)
Dept: RHEUMATOLOGY | Age: 46
End: 2021-08-31
Payer: SUBSIDIZED

## 2021-08-31 VITALS
BODY MASS INDEX: 25.13 KG/M2 | TEMPERATURE: 98.2 F | SYSTOLIC BLOOD PRESSURE: 113 MMHG | RESPIRATION RATE: 18 BRPM | DIASTOLIC BLOOD PRESSURE: 76 MMHG | WEIGHT: 151 LBS | HEART RATE: 71 BPM

## 2021-08-31 DIAGNOSIS — M05.79 SEROPOSITIVE RHEUMATOID ARTHRITIS OF MULTIPLE SITES (HCC): ICD-10-CM

## 2021-08-31 PROCEDURE — 99215 OFFICE O/P EST HI 40 MIN: CPT | Performed by: INTERNAL MEDICINE

## 2021-08-31 RX ORDER — METHOTREXATE 2.5 MG/1
20 TABLET ORAL
Qty: 96 TABLET | Refills: 1 | Status: SHIPPED | OUTPATIENT
Start: 2021-09-01 | End: 2022-01-14 | Stop reason: SDUPTHER

## 2021-08-31 RX ORDER — FOLIC ACID 1 MG/1
1 TABLET ORAL DAILY
Qty: 90 TABLET | Refills: 5 | Status: SHIPPED | OUTPATIENT
Start: 2021-08-31 | End: 2022-01-14 | Stop reason: SDUPTHER

## 2021-08-31 NOTE — LETTER
8/31/2021    Patient: Cinthia Trujillo   YOB: 1975   Date of Visit: 8/31/2021     Mckinley Whelan, [address]  [address] [place] [address]  [place]    Dear Mckinley Whelan MD,      Thank you for referring Mr. Montrell Peña to [address] [place] [address] for evaluation. My notes for this consultation are attached. If you have questions, please do not hesitate to call me. I look forward to following your patient along with you.       Sincerely,    Lani Molina MD

## 2021-08-31 NOTE — PROGRESS NOTES
REASON FOR VISIT    This is a follow-up visit for Mr. Eufemia Hoffman for     ICD-10-CM   1. Seropositive rheumatoid arthritis of multiple sites ([facility]  M05.79     Inflammatory arthritis phenotype includes:  Anti-CCP positive: yes (186)  Rheumatoid factor positive: yes (>600)  Erosive disease: N/A  Extra-articular manifestations include: none    Immunosuppression Screening (3/19/2021): Quantiferon TB: negative  PPD:  Not performed  Hepatitis B: negative  Hepatitis C: negative    Therapy History includes:  Current DMARD therapy includes: methotrexate 20 mg every Wednesday (3/04/2021 to present), infliximab 5 mg/kg every 8 weeks (6/21/2021 to present)  Prior DMARD therapy includes:  hydroxychloroquine 400 mg daily (2/26/2021 to 8/31/2021), methotrexate 12.5 mg every Wednesday (2/26/2021 to 3/04/2021)  The following DMARDs have been ineffective: none  The following DMARDs were stopped because of side effects: none    HISTORY OF PRESENT ILLNESS    Mr. Eufemia Hoffman returns for a follow-up. On his last visit, I continued hydroxychloroquine 400 mg daily and methotrexate 20 mg every Wednesday and started infliximab 5 mg/kg every 8 weeks, which he started on 6/21/2021 and has taken with good tolerance. His most recent dose was 8/02/2021. Today, he feels better. He has a little pain in his wrists and both heal that is intermittent and random. His wrist pain is all day that is the same all the time. No swelling. He has a little pain in hands at times but usually not much or swelling. He feels infliximab is helping him. He denies fever, weight loss, blurred vision, vision loss, oral ulcers, ankle swelling, dry cough, dyspnea, nausea, vomiting, dysphagia, abdominal pain, black or bloody stool, fall since last visit, rash, easy bruising and increased thirst.    Most recent toxicity monitoring by blood work was done on 6/21/2021 and did not reveal any significant adverse effects, except albumin 3.5 mg/dL.     I reviewed the patient's interval medical history, surgical history, medications, and allergies. The patient has not had any interval hospital admissions, infections, or surgeries. REVIEW OF SYSTEMS    A comprehensive review of systems was performed and pertinent results are documented in the HPI, review of systems is otherwise non-contributory. PAST MEDICAL HISTORY    He has a past medical history of Rheumatoid arteritis ([facility] (01/2021). FAMILY HISTORY    His family history includes Osteoporosis in his mother. SOCIAL HISTORY    He reports that he has quit smoking. He has never used smokeless tobacco. He reports current alcohol use of about 2.0 standard drinks of alcohol per week. He reports that he does not use drugs. MEDICATIONS    Current Outpatient Medications   Medication Sig    INFLIXIMAB IV 5 mg/kg by IntraVENous route every two (2) months.  [START ON 9/1/2021] methotrexate (RHEUMATREX) 2.5 mg tablet Take 8 Tablets by mouth every Wednesday.  folic acid (FOLVITE) 1 mg tablet Take 1 Tablet by mouth daily. No current facility-administered medications for this visit. ALLERGIES    No Known Allergies    PHYSICAL EXAMINATION    Visit Vitals  /76   Pulse 71   Temp 98.2 °F (36.8 °C)   Resp 18   Wt 151 lb (68.5 kg)   BMI 25.13 kg/m²     Body mass index is 25.13 kg/m². General: Patient is alert, oriented x 3, not in acute distress    HEENT:   Conjunctiva are not injected and appear moist, there is no alopecia. Cardiovascular:  Heart is regular rate and rhythm, no murmurs. Chest:  Lungs are clear to auscultation bilaterally. Extremities:  Free of clubbing, cyanosis, edema, extremities well perfused. Neurological exam:  Muscle strength is full in upper and lower extremities. Skin exam:  There are no rashes, no tophi, no psoriasis, no active Raynaud's, no livedo reticularis, no periungual erythema.     Musculoskeletal exam:  A comprehensive musculoskeletal exam was performed for all joints of each upper and lower extremity and assessed for swelling, tenderness and range of motion.  Positive results are documented as below:    Bilateral ankle synovitis (RESOLVED)  Bilateral MTP tenderness (RESOLVED)     Z-Deformities:                         no  Hollister Neck Deformities:         Yes (LEFT: 2nd, 4th, RIGHT: 2nd, 3rd, 4th)  Boutonierre's Deformities:      no  Ulnar Deviation:                      no  MCP Subluxation:                   no       Joint Count 8/31/2021 5/13/2021 3/4/2021   Patient pain (0-100) 10 80 25   MHAQ 0 1 0   Left elbow - Tender - - 1   Left elbow - Swollen - - 1   Left wrist- Tender 1 1 1   Left wrist- Swollen 1 1 1   Left 1st MCP - Tender - 1 1   Left 1st MCP - Swollen - 1 1   Left 2nd MCP - Tender - - 1   Left 2nd MCP - Swollen - - 1   Left 3rd MCP - Tender - - 1   Left 3rd MCP - Swollen - - 1   Left 4th MCP - Tender - - 1   Left 4th MCP - Swollen - - 0   Left 5th MCP - Tender - 1 1   Left 5th MCP - Swollen - 0 1   Left thumb IP - Tender - - 1   Left thumb IP - Swollen - - 1   Left 2nd PIP - Tender - 1 1   Left 2nd PIP - Swollen - 1 1   Left 3rd PIP - Tender 0 0 1   Left 3rd PIP - Swollen 1 1 1   Left 4th PIP - Tender 0 0 1   Left 4th PIP - Swollen 1 1 1   Left 5th PIP - Tender - - 1   Left 5th PIP - Swollen - - 1   Right elbow - Tender 1 - 1   Right elbow - Swollen 1 - 1   Right wrist- Tender 1 1 1   Right wrist- Swollen 0 1 1   Right 1st MCP - Tender - 1 1   Right 1st MCP - Swollen - 1 1   Right 2nd MCP - Tender - - 1   Right 2nd MCP - Swollen - - 0   Right 3rd MCP - Tender - 1 1   Right 3rd MCP - Swollen - 1 1   Right 4th MCP - Tender - 1 1   Right 4th MCP - Swollen - 0 1   Right 5th MCP - Tender - 1 1   Right 5th MCP - Swollen - 0 1   Right thumb IP - Tender - - 1   Right thumb IP - Swollen - - 1   Right 2nd PIP - Tender - 1 1   Right 2nd PIP - Swollen - 0 1   Right 3rd PIP - Tender 0 1 1   Right 3rd PIP - Swollen 1 1 1   Right 4th PIP - Tender 0 0 1 Right 4th PIP - Swollen 1 1 1   Right 5th PIP - Tender 0 0 1   Right 5th PIP - Swollen 1 1 1   Right knee - Tender - - 1   Right knee - Swollen - - 1   Tender Joint Count (Total) 3 11 25   Swollen Joint Count (Total) 7 11 23   Physician Assessment (0-10) 1.5 4 5   Patient Assessment (0-10) 2 8 2.5   CDAI Total (calculated) 13.5 34 55.5       DATA REVIEW    Laboratory     Recent laboratory results were reviewed, summarized, and discussed with the patient. Imaging    Musculoskeletal Ultrasound    None    Radiographs    None    CT Imaging    None    MR Imaging    None    DXA     None    ASSESSMENT AND PLAN    This is a follow-up visit for Mr. Shoaib Alvarez. 1) Seropositive Rheumatoid Arthritis. He is maintained on hydroxychloroquine 400 mg daily, methotrexate to 20 mg every Wednesday, and infliximab 5 mg/kg every 8 weeks, which he has taken with good tolerance. His most recent infusion was 8/02/2021. He feels much better after starting inflixiamb.     His CDAI was 13.5 ([id number],  55.5) with 3 tender and 7 swollen joints, consistent with moderate disease activity. I discontinue hydroxychloroquine. I will continue methotrexate and inflixiamb    2) Long Term Use of Immunosuppressants. The patient remains on high risk immunomodulatory medications (methotrexate, inflixiamb) and requires frequent toxicity monitoring by blood work to evaluate for toxicities. Respective labs are drawn with infusions. 3) Long Term Use of Hydroxychloroquine (Plaquenil). I will discontinue it. The patient voiced understanding of the aforementioned assessment and plan. Summary of plan was provided in the After Visit Summary patient instructions. A total of 42 minutes was spent on this visit, reviewing interval notes, interval testing results, ordering tests, refilling medications, documenting the findings in the note, patient education, counseling, and coordination of care as described above.  All questions asked and answered.     TODAY'S ORDERS    Orders Placed This Encounter    INFLIXIMAB IV    methotrexate (RHEUMATREX) 2.5 mg tablet    folic acid (FOLVITE) 1 mg tablet     Future Appointments   Date Time Provider Michelle Ho   9/27/2021  8:00  [facility] [address] Jv Horne MD, [address] [facility]    Adult Rheumatology   Rheumatology Ultrasound Certified  [address]  [facility], [address] [place] [address] Road   Phone [phone]  Fax [phone]

## 2021-09-27 ENCOUNTER — HOSPITAL ENCOUNTER (OUTPATIENT)
Dept: INFUSION THERAPY | Age: 46
Discharge: HOME | End: 2021-09-27
Payer: SUBSIDIZED

## 2021-09-27 VITALS
OXYGEN SATURATION: 99 % | TEMPERATURE: 97.2 F | RESPIRATION RATE: 18 BRPM | HEART RATE: 54 BPM | SYSTOLIC BLOOD PRESSURE: 112 MMHG | DIASTOLIC BLOOD PRESSURE: 63 MMHG | WEIGHT: 156 LBS | BODY MASS INDEX: 25.96 KG/M2

## 2021-09-27 DIAGNOSIS — M05.79 SEROPOSITIVE RHEUMATOID ARTHRITIS OF MULTIPLE SITES (HCC): Primary | ICD-10-CM

## 2021-09-27 DIAGNOSIS — M05.79 SEROPOSITIVE RHEUMATOID ARTHRITIS OF MULTIPLE SITES (HCC): ICD-10-CM

## 2021-09-27 PROCEDURE — 96375 TX/PRO/DX INJ NEW DRUG ADDON: CPT

## 2021-09-27 PROCEDURE — 96366 THER/PROPH/DIAG IV INF ADDON: CPT

## 2021-09-27 PROCEDURE — 74011250637 HC RX REV CODE- 250/637: Performed by: INTERNAL MEDICINE

## 2021-09-27 PROCEDURE — 96365 THER/PROPH/DIAG IV INF INIT: CPT

## 2021-09-27 PROCEDURE — 74011250636 HC RX REV CODE- 250/636: Performed by: INTERNAL MEDICINE

## 2021-09-27 RX ORDER — DIPHENHYDRAMINE HCL 25 MG
50 CAPSULE ORAL ONCE
Status: COMPLETED | OUTPATIENT
Start: 2021-09-27 | End: 2021-09-27

## 2021-09-27 RX ORDER — DIPHENHYDRAMINE HYDROCHLORIDE 50 MG/ML
25 INJECTION, SOLUTION INTRAMUSCULAR; INTRAVENOUS AS NEEDED
Status: CANCELLED
Start: 2021-10-11

## 2021-09-27 RX ORDER — ACETAMINOPHEN 325 MG/1
650 TABLET ORAL ONCE
Status: CANCELLED
Start: 2021-10-11 | End: 2021-10-11

## 2021-09-27 RX ORDER — ACETAMINOPHEN 325 MG/1
650 TABLET ORAL AS NEEDED
Status: CANCELLED
Start: 2021-10-11

## 2021-09-27 RX ORDER — DIPHENHYDRAMINE HCL 25 MG
50 CAPSULE ORAL ONCE
Status: CANCELLED | OUTPATIENT
Start: 2021-10-11 | End: 2021-10-11

## 2021-09-27 RX ORDER — SODIUM CHLORIDE 9 MG/ML
10 INJECTION INTRAMUSCULAR; INTRAVENOUS; SUBCUTANEOUS AS NEEDED
Status: CANCELLED | OUTPATIENT
Start: 2021-10-11

## 2021-09-27 RX ORDER — ONDANSETRON 2 MG/ML
8 INJECTION INTRAMUSCULAR; INTRAVENOUS AS NEEDED
Status: CANCELLED | OUTPATIENT
Start: 2021-10-11

## 2021-09-27 RX ORDER — ACETAMINOPHEN 325 MG/1
650 TABLET ORAL ONCE
Status: COMPLETED | OUTPATIENT
Start: 2021-09-27 | End: 2021-09-27

## 2021-09-27 RX ORDER — SODIUM CHLORIDE 0.9 % (FLUSH) 0.9 %
10 SYRINGE (ML) INJECTION AS NEEDED
Status: DISPENSED | OUTPATIENT
Start: 2021-09-27 | End: 2021-09-27

## 2021-09-27 RX ORDER — DIPHENHYDRAMINE HYDROCHLORIDE 50 MG/ML
50 INJECTION, SOLUTION INTRAMUSCULAR; INTRAVENOUS AS NEEDED
Status: CANCELLED
Start: 2021-10-11

## 2021-09-27 RX ORDER — SODIUM CHLORIDE 9 MG/ML
25 INJECTION, SOLUTION INTRAVENOUS CONTINUOUS
Status: CANCELLED | OUTPATIENT
Start: 2021-10-11

## 2021-09-27 RX ORDER — HEPARIN 100 UNIT/ML
300-500 SYRINGE INTRAVENOUS AS NEEDED
Status: CANCELLED
Start: 2021-10-11

## 2021-09-27 RX ORDER — HYDROCORTISONE SODIUM SUCCINATE 100 MG/2ML
100 INJECTION, POWDER, FOR SOLUTION INTRAMUSCULAR; INTRAVENOUS AS NEEDED
Status: CANCELLED | OUTPATIENT
Start: 2021-10-11

## 2021-09-27 RX ORDER — EPINEPHRINE 1 MG/ML
0.3 INJECTION, SOLUTION, CONCENTRATE INTRAVENOUS AS NEEDED
Status: CANCELLED | OUTPATIENT
Start: 2021-10-11

## 2021-09-27 RX ORDER — SODIUM CHLORIDE 9 MG/ML
25 INJECTION, SOLUTION INTRAVENOUS CONTINUOUS
Status: DISPENSED | OUTPATIENT
Start: 2021-09-27 | End: 2021-09-27

## 2021-09-27 RX ORDER — ALBUTEROL SULFATE 0.83 MG/ML
2.5 SOLUTION RESPIRATORY (INHALATION) AS NEEDED
Status: CANCELLED
Start: 2021-10-11

## 2021-09-27 RX ORDER — SODIUM CHLORIDE 0.9 % (FLUSH) 0.9 %
10 SYRINGE (ML) INJECTION AS NEEDED
Status: CANCELLED | OUTPATIENT
Start: 2021-11-29 | End: 2021-11-29

## 2021-09-27 RX ADMIN — METHYLPREDNISOLONE SODIUM SUCCINATE 40 MG: 40 INJECTION, POWDER, FOR SOLUTION INTRAMUSCULAR; INTRAVENOUS at 08:20

## 2021-09-27 RX ADMIN — ACETAMINOPHEN 650 MG: 325 TABLET ORAL at 08:17

## 2021-09-27 RX ADMIN — SODIUM CHLORIDE 25 ML/HR: 900 INJECTION, SOLUTION INTRAVENOUS at 08:10

## 2021-09-27 RX ADMIN — Medication 10 ML: at 08:10

## 2021-09-27 RX ADMIN — SODIUM CHLORIDE 300 MG: 9 INJECTION, SOLUTION INTRAVENOUS at 08:55

## 2021-09-27 RX ADMIN — DIPHENHYDRAMINE HYDROCHLORIDE 50 MG: 25 CAPSULE ORAL at 08:17

## 2021-09-27 NOTE — PROGRESS NOTES
[facility] Short Note                       Date: 2021    Name: Eva Cardoza    MRN: [id number]         : 1975    Treatment: Renflexis     [facility] COVID-19 SCREENING      The patient was asked the following questions and answered as documented below:    1. Do you have any symptoms of COVID-19? SOB, coughing, fever, or generally not feeling well? NO  2. Have you been exposed to COVID-19 recently? NO  3. Have you had any recent contact with family/friend that has a pending COVID test? NO      Follow Up: Proceed with treatment    Mr. Zabrina Baptiste was assessed and education was provided. Problem: Knowledge Deficit  Goal: *Verbalizes understanding of procedures and medications  Outcome: Progressing Towards Goal     Problem: Patient Education:  Go to Education Activity  Goal: Patient/Family Education  Outcome: Progressing Towards Goal    Lines: 24 gauge IV placed to the RFA  Peripheral IV 21 Right Forearm (Active)   Site Assessment Clean, dry, & intact 21 0809   Phlebitis Assessment 0 21 0809   Infiltration Assessment 0 21 0809   Dressing Status Clean, dry, & intact;New;Occlusive 21 0809   Dressing Type Transparent 21 0809   Hub Color/Line Status Yellow; Flushed;Patent 21 0809        Mr. Anette Perezs vitals were reviewed prior to and after treatment.    Patient Vitals for the past 12 hrs:   Temp Pulse Resp BP SpO2   21 1115 -- (!) 54 -- 112/63 --   21 0801 97.2 °F (36.2 °C) (!) 55 18 (!) 110/54 99 %       Medications given:  Medications Administered       0.9% sodium chloride infusion       Admin Date  2021 Action  New Bag Dose  25 mL/hr Rate  25 mL/hr Route  IntraVENous Administered By  Fernanda Amor RN              acetaminophen (TYLENOL) tablet 650 mg       Admin Date  2021 Action  Given Dose  650 mg Route  Oral Administered By  Fernanda Amor RN              diphenhydrAMINE (BENADRYL) capsule 50 mg       Admin Date  09/27/2021 Action  Given Dose  50 mg Route  Oral Administered By  Trupti Galarza RN              inFLIXimab-abda (RENFLEXIS) 300 mg in 0.9% sodium chloride 250 mL, overfill volume 25 mL infusion       Admin Date  09/27/2021 Action  New Bag Dose  300 mg Route  IntraVENous Administered By  Trupti Galarza RN              methylPREDNISolone (PF) (SOLU-MEDROL) injection 40 mg       Admin Date  09/27/2021 Action  Given Dose  40 mg Route  IntraVENous Administered By  Trupti Galarza RN              sodium chloride (NS) flush 10 mL       Admin Date  09/27/2021 Action  Given Dose  10 mL Route  IntraVENous Administered By  Trupti Galarza RN                    Mr. Merna Lockwood tolerated the treatment without complaints. IV flushed and removed. Mr. Merna Lockwood was discharged from [place] [address] in stable condition at 1120.       Patient provided with AVS , which includes future appointment and written educational material.     Future Appointments   Date Time Provider Michelle Ho   11/22/2021  8:00 AM [address] [place] [address]       Zohreh Gaona RN  September 27, 2021  11:20 AM

## 2021-09-27 NOTE — DISCHARGE INSTRUCTIONS
Patient Education   Infliximab-abda (By injection)   Infliximab-abda (in-FLIX-i-mab - abda)  Treats rheumatoid arthritis, psoriatic arthritis, ankylosing spondylitis, Crohn disease, plaque psoriasis, and ulcerative colitis. Brand Name(s): Renflexis   There may be other brand names for this medicine. When This Medicine Should Not Be Used: This medicine is not right for everyone. You should not receive it if you had an allergic reaction to infliximab products or murine (mouse) proteins. How to Use This Medicine:   Injectable  · Your doctor will prescribe your dose and schedule. This medicine is given through a needle placed in a vein. This medicine needs to be given slowly. The needle will remain in place for at least 2 hours. · A nurse or other health provider will give you this medicine. · This medicine should come with a Medication Guide. Ask your pharmacist for a copy if you do not have one. Drugs and Foods to Avoid:   Ask your doctor or pharmacist before using any other medicine, including over-the-counter medicines, vitamins, and herbal products. · Some foods and medicines may affect how infliximab-abda works. Tell your doctor if you are using any of the following:  ¨ Abatacept, anakinra, cyclosporine, etanercept, theophylline, tocilizumab  ¨ Blood thinner (including warfarin)  ¨ Medicine that weakens immune system (including methotrexate or a steroid)  · Tell your doctor if you have had light treatment for psoriasis. · You should not receive a vaccine without your doctor's approval. A live virus vaccine could cause an infection. Children should be current on all vaccines before they start treatment with this medicine. If you received infliximab-abda while pregnant, tell the baby's doctor.   Warnings While Using This Medicine:   · Tell your doctor if you are pregnant or breastfeeding, or if you have heart failure, COPD, liver disease, a bleeding disorder, blood or bone marrow problems, cancer, or heart disease. Tell your doctor if you have a history of seizures, multiple sclerosis, Guillain-Barré syndrome, or a similar nervous system disease. · This medicine may cause the following problems:  ¨ Higher risk of lymphoma or other cancers (including skin cancer)  ¨ Liver damage  ¨ Infusion reaction during or after the infusion, or if you start using the medicine again after not receiving it for a long time  ¨ Lupus-like syndrome  · This medicine may make you bleed, bruise, or get infections more easily. Take precautions to prevent illness and injury. Wash your hands often. · This medicine increases your risk of infection, which could result in serious or life-threatening illness. Tell your doctor if you have a history of frequent or serious infections, including tuberculosis or hepatitis B. Make sure your doctor knows if have an infection or already have trouble with your immune system. This risk may be higher in people who are older than 65 years or who have diabetes. Avoid sick people, and wash your hands often. · Your doctor will check your progress and the effects of this medicine at regular visits. Keep all appointments.   Possible Side Effects While Using This Medicine:   Call your doctor right away if you notice any of these side effects:  · Allergic reaction: Itching or hives, swelling in your face or hands, swelling or tingling in your mouth or throat, chest tightness, trouble breathing  · Dark urine or pale stools, nausea, vomiting, loss of appetite, stomach pain, yellow skin or eyes  · Fever, chills, cough, runny or stuffy nose, sore throat, body aches  · Joint or muscle pain or swelling, trouble swallowing  · Headache, lightheadedness, trouble breathing, rash  · Seizures, numbness, tingling, problems with vision, speech, or walking  · Unusual bleeding, bruising, tiredness, or weakness  · Warm, red, swollen, or painful skin, blisters, skin sores  If you notice these less serious side effects, talk with your doctor:   · Redness, pain, or swelling where the needle is placed  If you notice other side effects that you think are caused by this medicine, tell your doctor. Call your doctor for medical advice about side effects. You may report side effects to FDA at 9-741-LSR-1192  © 2017 [facility] Information is for End User's use only and may not be sold, redistributed or otherwise used for commercial purposes. The above information is an  only. It is not intended as medical advice for individual conditions or treatments. Talk to your doctor, nurse or pharmacist before following any medical regimen to see if it is safe and effective for you.

## 2021-11-11 ENCOUNTER — OFFICE VISIT (OUTPATIENT)
Dept: FAMILY MEDICINE CLINIC | Age: 46
End: 2021-11-11

## 2021-11-11 VITALS
OXYGEN SATURATION: 100 % | DIASTOLIC BLOOD PRESSURE: 62 MMHG | WEIGHT: 153 LBS | HEART RATE: 60 BPM | HEIGHT: 65 IN | TEMPERATURE: 98.5 F | SYSTOLIC BLOOD PRESSURE: 107 MMHG | BODY MASS INDEX: 25.49 KG/M2

## 2021-11-11 DIAGNOSIS — M05.79 RHEUMATOID ARTHRITIS INVOLVING MULTIPLE SITES WITH POSITIVE RHEUMATOID FACTOR (HCC): Primary | ICD-10-CM

## 2021-11-11 PROCEDURE — 99213 OFFICE O/P EST LOW 20 MIN: CPT | Performed by: FAMILY MEDICINE

## 2021-11-11 NOTE — PROGRESS NOTES
Coordination of Care  1. Have you been to the ER, urgent care clinic since your last visit? Hospitalized since your last visit? No    2. Have you seen or consulted any other health care providers outside of the [address] [place] [address] since your last visit? Include any pap smears or colon screening. No    Does the patient need refills? NO    Learning Assessment Complete?  yes  Depression Screening complete in the past 12 months? yes

## 2021-11-11 NOTE — PROGRESS NOTES
I have printed AVS and reviewed it with patient today. I have copied the provider's check out note here and have reviewed these items with the patient today: Check-out Note: Patient needs to renew Access now to continue receiving care by rheumatology  Patient verbalized understanding.  Rosalinda Trinidad RN

## 2021-11-11 NOTE — PROGRESS NOTES
HISTORY OF PRESENT ILLNESS  Silvia Roberson is a 55 y.o. male. HPI  Patient states he is doing well, receiving treatment with rheumatology. His access now is about to  and needs to renew it. Will have infusion 21. Review of Systems   Constitutional: Negative for chills, fever and weight loss. HENT: Negative for congestion, hearing loss, nosebleeds and sinus pain. Eyes: Negative for blurred vision, double vision and photophobia. Respiratory: Negative for cough, hemoptysis, sputum production and stridor. Cardiovascular: Negative for chest pain, palpitations and orthopnea. Gastrointestinal: Negative for abdominal pain, heartburn, nausea and vomiting. Genitourinary: Negative for dysuria, frequency and urgency. Musculoskeletal: Negative for joint pain and myalgias. Skin: Negative for itching and rash. Neurological: Negative for dizziness, tingling and headaches. /62 (BP 1 Location: Left arm, BP Patient Position: Sitting)   Pulse 60   Temp 98.5 °F (36.9 °C) (Temporal)   Ht 5' 5.35\" (1.66 m)   Wt 153 lb (69.4 kg)   SpO2 100%   BMI 25.19 kg/m²     Physical Exam  Constitutional:       General: He is not in acute distress. Appearance: He is not ill-appearing. HENT:      Head: Normocephalic. Cardiovascular:      Rate and Rhythm: Normal rate and regular rhythm. Pulses: Normal pulses. Heart sounds: Normal heart sounds. No murmur heard. Pulmonary:      Effort: Pulmonary effort is normal. No respiratory distress. Breath sounds: Normal breath sounds. No wheezing or rhonchi. Abdominal:      General: Bowel sounds are normal. There is no distension. Palpations: Abdomen is soft. Tenderness: There is no abdominal tenderness. Hernia: No hernia is present. Musculoskeletal:         General: No swelling or tenderness. Normal range of motion. Cervical back: Normal range of motion. No rigidity. Skin:     General: Skin is warm. Coloration: Skin is not pale. Findings: No erythema. Neurological:      Mental Status: He is alert. ASSESSMENT and PLAN  Diagnoses and all orders for this visit:    1.  Rheumatoid arthritis involving multiple sites with positive rheumatoid factor ([facility]  -     REFERRAL TO RHEUMATOLOGY      55year old male with rheumatoid arthritis, well controlled with infusions,  Needs to renew access now  Follow up as needed

## 2021-11-16 ENCOUNTER — OFFICE VISIT (OUTPATIENT)
Dept: FAMILY MEDICINE CLINIC | Age: 46
End: 2021-11-16

## 2021-11-16 DIAGNOSIS — Z71.89 COUNSELING AND COORDINATION OF CARE: Primary | ICD-10-CM

## 2021-11-16 PROCEDURE — 99080 SPECIAL REPORTS OR FORMS: CPT | Performed by: PHYSICIAN ASSISTANT

## 2021-11-16 NOTE — PROGRESS NOTES
MÓNICA QUEZADA called patient and started financial screening for AN. An appt was made for 11/23/21 at 3:15pm. Patient replied NO to all covid19 screening questions. Pending POI and tax return.

## 2021-11-22 ENCOUNTER — APPOINTMENT (OUTPATIENT)
Dept: INFUSION THERAPY | Age: 46
End: 2021-11-22

## 2021-11-23 ENCOUNTER — OFFICE VISIT (OUTPATIENT)
Dept: FAMILY MEDICINE CLINIC | Age: 46
End: 2021-11-23

## 2021-11-23 DIAGNOSIS — Z71.89 COUNSELING AND COORDINATION OF CARE: Primary | ICD-10-CM

## 2021-11-23 PROCEDURE — 99080 SPECIAL REPORTS OR FORMS: CPT | Performed by: PHYSICIAN ASSISTANT

## 2021-11-23 NOTE — PROGRESS NOTES
OW met with patient and assisted with AN. Application was completed. OW instructed patient to call AN on/after 12/7/21. Pt verbalized understanding.

## 2021-11-24 ENCOUNTER — TELEPHONE (OUTPATIENT)
Dept: FAMILY MEDICINE CLINIC | Age: 46
End: 2021-11-24

## 2021-11-29 ENCOUNTER — HOSPITAL ENCOUNTER (OUTPATIENT)
Dept: INFUSION THERAPY | Age: 46
Discharge: HOME | End: 2021-11-29
Payer: SUBSIDIZED

## 2021-11-29 VITALS
HEIGHT: 65 IN | BODY MASS INDEX: 26.36 KG/M2 | SYSTOLIC BLOOD PRESSURE: 120 MMHG | WEIGHT: 158.2 LBS | DIASTOLIC BLOOD PRESSURE: 72 MMHG | OXYGEN SATURATION: 100 % | TEMPERATURE: 97.7 F | RESPIRATION RATE: 16 BRPM | HEART RATE: 63 BPM

## 2021-11-29 DIAGNOSIS — M05.79 SEROPOSITIVE RHEUMATOID ARTHRITIS OF MULTIPLE SITES (HCC): Primary | ICD-10-CM

## 2021-11-29 LAB
ALBUMIN SERPL-MCNC: 3.3 G/DL (ref 3.5–5)
ALBUMIN/GLOB SERPL: 0.8 {RATIO} (ref 1.1–2.2)
ALP SERPL-CCNC: 46 U/L (ref 45–117)
ALT SERPL-CCNC: 55 U/L (ref 12–78)
ANION GAP SERPL CALC-SCNC: 8 MMOL/L (ref 5–15)
AST SERPL-CCNC: 40 U/L (ref 15–37)
BASOPHILS # BLD: 0 K/UL (ref 0–0.1)
BASOPHILS NFR BLD: 1 % (ref 0–1)
BILIRUB SERPL-MCNC: 1.4 MG/DL (ref 0.2–1)
BUN SERPL-MCNC: 14 MG/DL (ref 6–20)
BUN/CREAT SERPL: 18 (ref 12–20)
CALCIUM SERPL-MCNC: 8.4 MG/DL (ref 8.5–10.1)
CHLORIDE SERPL-SCNC: 103 MMOL/L (ref 97–108)
CO2 SERPL-SCNC: 27 MMOL/L (ref 21–32)
CREAT SERPL-MCNC: 0.79 MG/DL (ref 0.7–1.3)
CRP SERPL-MCNC: <0.29 MG/DL (ref 0–0.6)
DIFFERENTIAL METHOD BLD: NORMAL
EOSINOPHIL # BLD: 0.1 K/UL (ref 0–0.4)
EOSINOPHIL NFR BLD: 3 % (ref 0–7)
ERYTHROCYTE [DISTWIDTH] IN BLOOD BY AUTOMATED COUNT: 12.7 % (ref 11.5–14.5)
ERYTHROCYTE [SEDIMENTATION RATE] IN BLOOD: 44 MM/HR (ref 0–15)
GLOBULIN SER CALC-MCNC: 4.3 G/DL (ref 2–4)
GLUCOSE SERPL-MCNC: 149 MG/DL (ref 65–100)
HCT VFR BLD AUTO: 42.3 % (ref 36.6–50.3)
HGB BLD-MCNC: 14.6 G/DL (ref 12.1–17)
IMM GRANULOCYTES # BLD AUTO: 0 K/UL (ref 0–0.04)
IMM GRANULOCYTES NFR BLD AUTO: 0 % (ref 0–0.5)
LYMPHOCYTES # BLD: 1.2 K/UL (ref 0.8–3.5)
LYMPHOCYTES NFR BLD: 25 % (ref 12–49)
MCH RBC QN AUTO: 30.2 PG (ref 26–34)
MCHC RBC AUTO-ENTMCNC: 34.5 G/DL (ref 30–36.5)
MCV RBC AUTO: 87.4 FL (ref 80–99)
MONOCYTES # BLD: 0.6 K/UL (ref 0–1)
MONOCYTES NFR BLD: 13 % (ref 5–13)
NEUTS SEG # BLD: 2.7 K/UL (ref 1.8–8)
NEUTS SEG NFR BLD: 58 % (ref 32–75)
NRBC # BLD: 0 K/UL (ref 0–0.01)
NRBC BLD-RTO: 0 PER 100 WBC
PLATELET # BLD AUTO: 206 K/UL (ref 150–400)
PMV BLD AUTO: 9.8 FL (ref 8.9–12.9)
POTASSIUM SERPL-SCNC: 3.2 MMOL/L (ref 3.5–5.1)
PROT SERPL-MCNC: 7.6 G/DL (ref 6.4–8.2)
RBC # BLD AUTO: 4.84 M/UL (ref 4.1–5.7)
SODIUM SERPL-SCNC: 138 MMOL/L (ref 136–145)
WBC # BLD AUTO: 4.6 K/UL (ref 4.1–11.1)

## 2021-11-29 PROCEDURE — 36415 COLL VENOUS BLD VENIPUNCTURE: CPT

## 2021-11-29 PROCEDURE — 74011250637 HC RX REV CODE- 250/637: Performed by: INTERNAL MEDICINE

## 2021-11-29 PROCEDURE — 96365 THER/PROPH/DIAG IV INF INIT: CPT

## 2021-11-29 PROCEDURE — 85652 RBC SED RATE AUTOMATED: CPT

## 2021-11-29 PROCEDURE — 74011250636 HC RX REV CODE- 250/636: Performed by: INTERNAL MEDICINE

## 2021-11-29 PROCEDURE — 80053 COMPREHEN METABOLIC PANEL: CPT

## 2021-11-29 PROCEDURE — 86140 C-REACTIVE PROTEIN: CPT

## 2021-11-29 PROCEDURE — 96366 THER/PROPH/DIAG IV INF ADDON: CPT

## 2021-11-29 PROCEDURE — 85025 COMPLETE CBC W/AUTO DIFF WBC: CPT

## 2021-11-29 RX ORDER — DIPHENHYDRAMINE HCL 25 MG
50 CAPSULE ORAL ONCE
Status: ACTIVE | OUTPATIENT
Start: 2021-11-29 | End: 2021-11-29

## 2021-11-29 RX ORDER — ACETAMINOPHEN 325 MG/1
650 TABLET ORAL ONCE
Status: CANCELLED
Start: 2022-01-23 | End: 2022-01-23

## 2021-11-29 RX ORDER — ACETAMINOPHEN 325 MG/1
650 TABLET ORAL ONCE
Status: COMPLETED | OUTPATIENT
Start: 2021-11-29 | End: 2021-11-29

## 2021-11-29 RX ORDER — DIPHENHYDRAMINE HYDROCHLORIDE 50 MG/ML
50 INJECTION, SOLUTION INTRAMUSCULAR; INTRAVENOUS AS NEEDED
Status: CANCELLED
Start: 2022-01-23

## 2021-11-29 RX ORDER — EPINEPHRINE 1 MG/ML
0.3 INJECTION, SOLUTION, CONCENTRATE INTRAVENOUS AS NEEDED
Status: CANCELLED | OUTPATIENT
Start: 2022-01-23

## 2021-11-29 RX ORDER — DIPHENHYDRAMINE HCL 25 MG
50 CAPSULE ORAL ONCE
Status: CANCELLED | OUTPATIENT
Start: 2022-01-23 | End: 2022-01-23

## 2021-11-29 RX ORDER — SODIUM CHLORIDE 9 MG/ML
25 INJECTION, SOLUTION INTRAVENOUS CONTINUOUS
Status: CANCELLED | OUTPATIENT
Start: 2022-01-24 | End: 2022-01-24

## 2021-11-29 RX ORDER — SODIUM CHLORIDE 0.9 % (FLUSH) 0.9 %
10 SYRINGE (ML) INJECTION AS NEEDED
Status: DISPENSED | OUTPATIENT
Start: 2021-11-29 | End: 2021-11-29

## 2021-11-29 RX ORDER — ONDANSETRON 2 MG/ML
8 INJECTION INTRAMUSCULAR; INTRAVENOUS AS NEEDED
Status: CANCELLED | OUTPATIENT
Start: 2022-01-23

## 2021-11-29 RX ORDER — DIPHENHYDRAMINE HYDROCHLORIDE 50 MG/ML
25 INJECTION, SOLUTION INTRAMUSCULAR; INTRAVENOUS AS NEEDED
Status: CANCELLED
Start: 2022-01-23

## 2021-11-29 RX ORDER — SODIUM CHLORIDE 9 MG/ML
10 INJECTION INTRAMUSCULAR; INTRAVENOUS; SUBCUTANEOUS AS NEEDED
Status: CANCELLED | OUTPATIENT
Start: 2022-01-23

## 2021-11-29 RX ORDER — ALBUTEROL SULFATE 0.83 MG/ML
2.5 SOLUTION RESPIRATORY (INHALATION) AS NEEDED
Status: CANCELLED
Start: 2022-01-23

## 2021-11-29 RX ORDER — SODIUM CHLORIDE 0.9 % (FLUSH) 0.9 %
10 SYRINGE (ML) INJECTION AS NEEDED
Status: CANCELLED | OUTPATIENT
Start: 2022-01-24 | End: 2022-01-24

## 2021-11-29 RX ORDER — ACETAMINOPHEN 325 MG/1
650 TABLET ORAL AS NEEDED
Status: CANCELLED
Start: 2022-01-23

## 2021-11-29 RX ORDER — HEPARIN 100 UNIT/ML
300-500 SYRINGE INTRAVENOUS AS NEEDED
Status: CANCELLED
Start: 2022-01-23

## 2021-11-29 RX ORDER — HYDROCORTISONE SODIUM SUCCINATE 100 MG/2ML
100 INJECTION, POWDER, FOR SOLUTION INTRAMUSCULAR; INTRAVENOUS AS NEEDED
Status: CANCELLED | OUTPATIENT
Start: 2022-01-23

## 2021-11-29 RX ORDER — SODIUM CHLORIDE 9 MG/ML
25 INJECTION, SOLUTION INTRAVENOUS CONTINUOUS
Status: DISPENSED | OUTPATIENT
Start: 2021-11-29 | End: 2021-11-29

## 2021-11-29 RX ADMIN — Medication 10 ML: at 11:29

## 2021-11-29 RX ADMIN — SODIUM CHLORIDE 300 MG: 9 INJECTION, SOLUTION INTRAVENOUS at 09:19

## 2021-11-29 RX ADMIN — ACETAMINOPHEN 650 MG: 325 TABLET ORAL at 08:42

## 2021-11-29 RX ADMIN — METHYLPREDNISOLONE SODIUM SUCCINATE 40 MG: 40 INJECTION, POWDER, FOR SOLUTION INTRAMUSCULAR; INTRAVENOUS at 08:50

## 2021-11-29 RX ADMIN — SODIUM CHLORIDE 25 ML/HR: 900 INJECTION, SOLUTION INTRAVENOUS at 08:48

## 2021-11-29 NOTE — PROGRESS NOTES
[facility] Progress Note                                 Date: November 29, 2021       Treatment: Renflexis      Mr. Lynell Canavan arrived in no acute distress at 65. Patient COVID Screening completed:   Do you have any symptoms of COVID-19? SOB, coughing, fever, or generally not feeling well? NO   Have you been exposed to COVID-19 recently? NO   Have you had any recent contact with family/friend that has a pending COVID test? NO    Assessment was unremarkable with the exception of left wrist/hand pain when doing push-ups. No other concerns voiced. 24G PIV established in R A/C with positive blood return noted. Problem: Knowledge Deficit  Goal: *Verbalizes understanding of procedures and medications  Outcome: Progressing Towards Goal     Problem: Patient Education:  Go to Education Activity  Goal: Patient/Family Education  Outcome: Progressing Towards Goal    Mr. Krystal Govea's vitals for today's visit. Patient Vitals for the past 12 hrs:   Temp Pulse Resp BP SpO2   11/29/21 1118 -- 63 16 120/72 --   11/29/21 0812 97.7 °F (36.5 °C) 60 18 122/64 100 %       Lab results:  Recent Results (from the past 12 hour(s))   CBC WITH AUTOMATED DIFF    Collection Time: 11/29/21  8:24 AM   Result Value Ref Range    WBC 4.6 4.1 - 11.1 K/uL    RBC 4.84 4.10 - 5.70 M/uL    HGB 14.6 12.1 - 17.0 g/dL    HCT 42.3 36.6 - 50.3 %    MCV 87.4 80.0 - 99.0 FL    MCH 30.2 26.0 - 34.0 PG    MCHC 34.5 30.0 - 36.5 g/dL    RDW 12.7 11.5 - 14.5 %    PLATELET 005 487 - 823 K/uL    MPV 9.8 8.9 - 12.9 FL    NRBC 0.0 0  WBC    ABSOLUTE NRBC 0.00 0.00 - 0.01 K/uL    NEUTROPHILS 58 32 - 75 %    LYMPHOCYTES 25 12 - 49 %    MONOCYTES 13 5 - 13 %    EOSINOPHILS 3 0 - 7 %    BASOPHILS 1 0 - 1 %    IMMATURE GRANULOCYTES 0 0.0 - 0.5 %    ABS. NEUTROPHILS 2.7 1.8 - 8.0 K/UL    ABS. LYMPHOCYTES 1.2 0.8 - 3.5 K/UL    ABS. MONOCYTES 0.6 0.0 - 1.0 K/UL    ABS. EOSINOPHILS 0.1 0.0 - 0.4 K/UL    ABS. BASOPHILS 0.0 0.0 - 0.1 K/UL    ABS. IMM. GRANS. 0.0 0.00 - 0.04 K/UL    DF AUTOMATED     METABOLIC PANEL, COMPREHENSIVE    Collection Time: 11/29/21  8:24 AM   Result Value Ref Range    Sodium 138 136 - 145 mmol/L    Potassium 3.2 (L) 3.5 - 5.1 mmol/L    Chloride 103 97 - 108 mmol/L    CO2 27 21 - 32 mmol/L    Anion gap 8 5 - 15 mmol/L    Glucose 149 (H) 65 - 100 mg/dL    BUN 14 6 - 20 MG/DL    Creatinine 0.79 0.70 - 1.30 MG/DL    BUN/Creatinine ratio 18 12 - 20      GFR est AA >60 >60 ml/min/1.73m2    GFR est non-AA >60 >60 ml/min/1.73m2    Calcium 8.4 (L) 8.5 - 10.1 MG/DL    Bilirubin, total 1.4 (H) 0.2 - 1.0 MG/DL    ALT (SGPT) 55 12 - 78 U/L    AST (SGOT) 40 (H) 15 - 37 U/L    Alk.  phosphatase 46 45 - 117 U/L    Protein, total 7.6 6.4 - 8.2 g/dL    Albumin 3.3 (L) 3.5 - 5.0 g/dL    Globulin 4.3 (H) 2.0 - 4.0 g/dL    A-G Ratio 0.8 (L) 1.1 - 2.2     SED RATE (ESR)    Collection Time: 11/29/21  8:24 AM   Result Value Ref Range    Sed rate, automated 44 (H) 0 - 15 mm/hr   C REACTIVE PROTEIN, QT    Collection Time: 11/29/21  8:24 AM   Result Value Ref Range    C-Reactive protein <0.29 0.00 - 0.60 mg/dL       Medications given:  Medications Administered     0.9% sodium chloride infusion     Admin Date  11/29/2021 Action  New Bag Dose  25 mL/hr Rate  25 mL/hr Route  IntraVENous Administered By  Romario Scales RN          acetaminophen (TYLENOL) tablet 650 mg     Admin Date  11/29/2021 Action  Given Dose  650 mg Route  Oral Administered By  Romario Scales RN          inFLIXimab-abda (RENFLEXIS) 300 mg in 0.9% sodium chloride 250 mL, overfill volume 25 mL infusion     Admin Date  11/29/2021 Action  New Bag Dose  300 mg Rate  138 mL/hr Route  IntraVENous Administered By  Romario Scales RN          methylPREDNISolone (PF) (SOLU-MEDROL) injection 40 mg     Admin Date  11/29/2021 Action  Given Dose  40 mg Route  IntraVENous Administered By  Romario Scales RN          sodium chloride (NS) flush 10 mL     Admin Date  11/29/2021 Action  Given Dose  10 mL Route  IntraVENous Administered By  Hue Martinez RN                Mr. Aman Monroy tolerated the treatment without complaints. PIV flushed and removed, 2x2 and coban placed. Mr. Aman Monroy was discharged from [place] [address] in stable condition at 1130.      Future Appointments   Date Time Provider Michelle Ho   12/30/2021 11:00 AM Lyndsay Polanco MD [facility] BS AMB   1/24/2022  8:00 AM [facility] INFUSION NURSE 1 [address] [facility]       Rachel Nix RN  November 29, 2021  8:35 AM

## 2021-11-29 NOTE — DISCHARGE INSTRUCTIONS
Patient Education   Infliximab (Por inyección)   Se [place] para tratar la artritis reumatoide, artritis psoriásica, espondilitis anquilosante, enfermedad de Crohn, las placas de psoriasis y colitis ulcerativa. Yessica(s) : Inflectra, Remicade, Renflexis   Existen muchas otras marcas de [employer]. Kendy medicamento no debe ser usado cuando:   Kendy medicamento no es adecuado para todas las personas. Usted no debe recibirlo si rivera tenido akosua reacción alérgica al infliximab o a las proteínas murinas (ratón). Forma de usar kendy medicamento:   [place]  · Guzman médico le prescribirá guzman dosis y horario. Kendy medicamento se administra a través de akosua aguja en la vena. Kendy medicamento debe administrarse lentamente. La aguja tiene que permanecer colocada abdirahman al menos 2 horas. · Elva Riddles u otro médico le administrará kendy medicamento. · [place] debe venir con Iza Verdugoía del medicamento. Solicite akosua copia con guzman farmacéutico en lorena de no tener la guía. Medicamentos y [employer] que debe evitar:   Consulte con guzman médico o farmacéutico antes de usar cualquier medicamento, incluyendo los que compra sin receta médica, las vitaminas y los productos herbales. · Algunos alimentos y medicamentos pueden afectar la eficacia de infliximab. Informe a guzman médico si está usando cualquiera de los siguientes:  ¨ Abatacept, anakinra, ciclosporina, etanercept, teofilina, tocilizumab  ¨ Anticoagulantes (incluyendo warfarina)  ¨ Medicamentos que debilitan el sistema inmunológico (incluyendo metotrexato o un esteroide)  · Infórmele al médico si usted ha recibido fototerapia para tratar la psoriasis. · Usted no debe recibir akosua vacuna sin aprobación médica. Joie Ernestina de virus vivos podría causar akosua infección. Los niños deben estar al corriente con todas las vacunas antes de comenzar el tratamiento con kendy medicamento. Si usted recibió infliximab abdirahman el embarazo, informe al médico de guzman bebé.   Precauciones abdirahman el uso de kendy medicamento:   · Infórmele al médico si usted está embarazada o dando de lactar, o si padece de insuficiencia cardíaca, enfermedad de obstrucción pulmonar crónica (EOPC), enfermedad hepática, un trastorno sanguíneo, problemas de la damian o [address], cáncer, o enfermedad cardíaca. Infórmele al médico si usted tiene antecedentes de convulsiones, esclerosis múltiple, síndrome de Guillain-Barré, u otra enfermedad similar del sistema nervioso. · Kendy medicamento puede causar los siguientes problemas:  ¨ Mayor riesgo de linfoma u otros cánceres (inclusive cáncer de la piel)  ¨ Daño hepático  ¨ Reacción a la infusión abdirahman o después de la infusión, o si comienza a usar el medicamento nuevamente después de no recibirlo por un tiempo prolongado  ¨ Bajo recuento de células sanguíneas  · Kendy medicamento aumenta guzman riego de contraer infecciones, y puede resultar en akosua enfermedad severa o de peligro mortal. Infórmele a guzman médico si usted tiene un historial de infecciones frecuentes o serias, incluyendo tuberculosis o hepatitis B. Asegúrese que guzman médico sepa si tiene akosua infección actual o si padece de problemas con el sistema inmunológico. El riesgo puede ser Nikunj Peggy en personas mayores de 72 años o que tienen diabetes. Evite a las personas con enfermedades, y [employer]. · Guzman médico tendrá que revisar guzman progreso y los efectos de kendy medicamento abdirahman russ citas regulares. Cumpla sin falta con todas russ citas médicas. Asista a todas russ citas.   Efectos secundarios que pueden presentarse abdirahman el uso de kendy medicamento:   Consulte inmediatamente con el médico si nota cualquiera de estos efectos secundarios:  · Reacción alérgica: Comezón o ronchas, hinchazón del liliane o las bina, hinchazón u hormigueo en la boca o garganta, opresión en el pecho, dificultad para respirar  · [employer] en la cantidad o frecuencia con que orina, dolor al orinar  · [place]res city oscura o heces pálidas, náuseas, vómitos, falta de apetito, dolor estomacal, coloración amarillenta en la piel u ojos  · Facundo, escalofríos, tos, flujo o congestión nasal, dolor de garganta, shu de cuerpo  · Facundo, sarpullido, dolor de marcia, dolor de garganta, dolor articular o muscular, inflamación, dificultad para tragar  · Dolor de marcia, desvanecimiento, fiebre, escalofríos, náusea, dificultad para respirar, sarpullido  · Convulsiones, adormecimiento, hormigueo, problemas con la vista, el habla o al caminar  · [facility], moreton, cansancio o debilidad  · Piel tibia, lesia, inflamada o adolorida, ampollas o llagas en la piel  Consulte con el médico si nota los siguientes efectos secundarios menos graves:   · Enrojecimiento, dolor, o inflamación donde se coloca la aguja  Consulte con el médico si nota otros efectos secundarios que shamika son causados por brody medicamento. Llame a guzman médico para consultarle Richy. Usted puede notificar russ efectos secundarios al FDA al [phone]-[phone]. © 2017 [facility] Information is for End User's use only and may not be sold, redistributed or otherwise used for commercial purposes. Esta información es sólo para uso en educación. Guzman intención no es darle un consejo médico sobre enfermedades o tratamientos. Colsulte con guzman Leonarda Hunger farmacéutico antes de seguir cualquier régimen médico para saber si es seguro y efectivo para usted.

## 2021-12-09 ENCOUNTER — TELEPHONE (OUTPATIENT)
Dept: FAMILY MEDICINE CLINIC | Age: 46
End: 2021-12-09

## 2021-12-09 NOTE — TELEPHONE ENCOUNTER
OW called pt to let him know we need a new notarized employment letter that needs to be signed by employer. A new appt was made for 12/14/21 at 3:00pm. Patient replied NO to all covid19 screening questions. OW told patient not to call [facility] appt line yet.

## 2021-12-14 ENCOUNTER — OFFICE VISIT (OUTPATIENT)
Dept: FAMILY MEDICINE CLINIC | Age: 46
End: 2021-12-14

## 2021-12-14 DIAGNOSIS — Z71.89 COUNSELING AND COORDINATION OF CARE: Primary | ICD-10-CM

## 2021-12-14 PROCEDURE — 99080 SPECIAL REPORTS OR FORMS: CPT | Performed by: PHYSICIAN ASSISTANT

## 2021-12-14 NOTE — PROGRESS NOTES
OW met with patient and he brought pendin (signed) POI. POI was given to [address] (She'll bring it to [facility] [place] [address] tomorrow 12/15/21). Patient asked for assistance with a pending bill. OW started financial screening for Care Card and explained the process to patient. Pending Bank statement, tax forms and poi. Pt took application with him to complete yearly gross income. OW told patient he can call OW or SHARON to schedule an appt to complete his FA application. Pt verbalized understanding.

## 2022-01-03 ENCOUNTER — TELEPHONE (OUTPATIENT)
Dept: FAMILY MEDICINE CLINIC | Age: 47
End: 2022-01-03

## 2022-01-03 NOTE — TELEPHONE ENCOUNTER
Patient called OW asking about his Access code for My chart while OW was on PTO. OW received patient's vm and called patient back. OW provided access code to patient today via phone call. Pt verbalized understanding.

## 2022-01-11 ENCOUNTER — TELEPHONE (OUTPATIENT)
Dept: FAMILY MEDICINE CLINIC | Age: 47
End: 2022-01-11

## 2022-01-11 ENCOUNTER — OFFICE VISIT (OUTPATIENT)
Dept: FAMILY MEDICINE CLINIC | Age: 47
End: 2022-01-11

## 2022-01-11 DIAGNOSIS — Z71.89 COUNSELING AND COORDINATION OF CARE: Primary | ICD-10-CM

## 2022-01-11 PROCEDURE — 99080 SPECIAL REPORTS OR FORMS: CPT | Performed by: PHYSICIAN ASSISTANT

## 2022-01-11 NOTE — TELEPHONE ENCOUNTER
OW received pending POI from patient via text message. POI was sent to Cassie Gallegos via [employer].

## 2022-01-11 NOTE — PROGRESS NOTES
OW called patient to f/up with pending doc. OW asked pt for a new POI that includes employer's signature. Patient said he'd send it to OW as soon as he has it. OW asked patient to send a picture via text message. Patient told the OW he recently received a bill from an infusion at [facility]. OW started financial screening for Care Card. Pending POI, Bank statement, tax return. An appt was made for 1/14/22. Patient replied NO to all Preston Mena screening questions.

## 2022-01-13 ENCOUNTER — TELEPHONE (OUTPATIENT)
Dept: FAMILY MEDICINE CLINIC | Age: 47
End: 2022-01-13

## 2022-01-13 NOTE — TELEPHONE ENCOUNTER
MÓNICA QUEZADA called pt to reschedule appt for bills. As per patient, he spoke with someone from [facility] who asked him to send his [facility] card and they would help him with pending bills.  DAMIEN cancelled pt's appt without reschedule, per patient's request.

## 2022-01-14 ENCOUNTER — OFFICE VISIT (OUTPATIENT)
Dept: RHEUMATOLOGY | Age: 47
End: 2022-01-14
Payer: SUBSIDIZED

## 2022-01-14 VITALS
TEMPERATURE: 97.9 F | RESPIRATION RATE: 18 BRPM | WEIGHT: 157 LBS | BODY MASS INDEX: 26.13 KG/M2 | DIASTOLIC BLOOD PRESSURE: 71 MMHG | SYSTOLIC BLOOD PRESSURE: 103 MMHG | HEART RATE: 67 BPM

## 2022-01-14 DIAGNOSIS — Z11.59 ENCOUNTER FOR SCREENING FOR OTHER VIRAL DISEASES: ICD-10-CM

## 2022-01-14 DIAGNOSIS — M05.79 SEROPOSITIVE RHEUMATOID ARTHRITIS OF MULTIPLE SITES (HCC): Primary | ICD-10-CM

## 2022-01-14 DIAGNOSIS — Z79.60 LONG-TERM USE OF IMMUNOSUPPRESSANT MEDICATION: ICD-10-CM

## 2022-01-14 PROCEDURE — 99214 OFFICE O/P EST MOD 30 MIN: CPT | Performed by: INTERNAL MEDICINE

## 2022-01-14 RX ORDER — METHOTREXATE 2.5 MG/1
20 TABLET ORAL
Qty: 96 TABLET | Refills: 1 | Status: SHIPPED | OUTPATIENT
Start: 2022-01-19 | End: 2022-04-18 | Stop reason: SDUPTHER

## 2022-01-14 RX ORDER — FOLIC ACID 1 MG/1
1 TABLET ORAL DAILY
Qty: 90 TABLET | Refills: 5 | Status: SHIPPED | OUTPATIENT
Start: 2022-01-14

## 2022-01-14 NOTE — PROGRESS NOTES
REASON FOR VISIT    This is a follow-up visit for Mr. Twyla Bailey for     ICD-10-CM   1. Seropositive rheumatoid arthritis of multiple sites [facility]  M05.79     Inflammatory arthritis phenotype includes:  Anti-CCP positive: yes (186)  Rheumatoid factor positive: yes (>600)  Erosive disease: N/A  Extra-articular manifestations include: none    Immunosuppression Screening (3/19/2021): Quantiferon TB: negative  PPD:  Not performed  Hepatitis B: negative  Hepatitis C: negative    Therapy History includes:  Current DMARD therapy includes: methotrexate 20 mg every Wednesday (3/04/2021 to present), infliximab 5 mg/kg every 8 weeks (6/21/2021 to present)  Prior DMARD therapy includes:  hydroxychloroquine 400 mg daily (2/26/2021 to 8/31/2021), methotrexate 12.5 mg every Wednesday (2/26/2021 to 3/04/2021)  The following DMARDs have been ineffective: none  The following DMARDs were stopped because of side effects: none    HISTORY OF PRESENT ILLNESS    Mr. Twyla Bailey returns for a follow-up. On his last visit, I discontinued hydroxychloroquine 400 mg daily and continued methotrexate 20 mg every Wednesday and infliximab 5 mg/kg every 8 weeks, which he has taken with good tolerance. His most recent dose was 11/29/2021. Today, he feels much better. He has a little pain in his wrists, hands, ankles and feet that may be all day. Hot showers help. He denies swelling or stiffness. He reports intermittent sensation of cold and heat inside his body either during the day or night but he has been tested negative for COVID.     He denies fever, weight loss, blurred vision, vision loss, oral ulcers, ankle swelling, dry cough, dyspnea, nausea, vomiting, dysphagia, abdominal pain, black or bloody stool, fall since last visit, rash, easy bruising and increased thirst.    Most recent toxicity monitoring by blood work was done on 11/29/2021 and did not reveal any significant adverse effects, except AST 40, albumin 3.3 mg/dL.    I reviewed the patient's interval medical history, surgical history, medications, and allergies. The patient has not had any interval hospital admissions, infections, or surgeries. REVIEW OF SYSTEMS    A comprehensive review of systems was performed and pertinent results are documented in the HPI, review of systems is otherwise non-contributory. PAST MEDICAL HISTORY    He has a past medical history of Rheumatoid arteritis ([address] (01/2021). FAMILY HISTORY    His family history includes Osteoporosis in his mother. SOCIAL HISTORY    He reports that he has been smoking. He has never used smokeless tobacco. He reports current alcohol use of about 2.0 standard drinks of alcohol per week. He reports previous drug use. MEDICATIONS    Current Outpatient Medications   Medication Sig    [START ON 1/19/2022] methotrexate (RHEUMATREX) 2.5 mg tablet Take 8 Tablets by mouth every Wednesday.  folic acid (FOLVITE) 1 mg tablet Take 1 Tablet by mouth daily.  INFLIXIMAB IV 5 mg/kg by IntraVENous route every two (2) months. No current facility-administered medications for this visit. ALLERGIES    No Known Allergies    PHYSICAL EXAMINATION    Visit Vitals  /71   Pulse 67   Temp 97.9 °F (36.6 °C)   Resp 18   Wt 157 lb (71.2 kg)   BMI 26.13 kg/m²     Body mass index is 26.13 kg/m². General: Patient is alert, oriented x 3, not in acute distress    HEENT:   Conjunctiva are not injected and appear moist     Chest:  Breathing comfortably at room air    Musculoskeletal exam:  A comprehensive musculoskeletal exam was performed for all joints of each upper and lower extremity and assessed for swelling, tenderness and range of motion.  Positive results are documented as below:    Bilateral ankle swelling with tenderness  Bilateral MTP tenderness     Z-Deformities:                         no  [place]ck Deformities:         Yes (LEFT: 2nd, 4th, RIGHT: 2nd, 3rd, 4th)  Boutonierre's Deformities: no  Ulnar Deviation:                      no  MCP Subluxation:                   no       Joint Count 1/14/2022 8/31/2021 5/13/2021 3/4/2021   Patient pain (0-100) 20 10 80 25   MHAQ 0.25 0 1 0   Left elbow - Tender - - - 1   Left elbow - Swollen - - - 1   Left wrist- Tender 1 1 1 1   Left wrist- Swollen 0 1 1 1   Left 1st MCP - Tender - - 1 1   Left 1st MCP - Swollen - - 1 1   Left 2nd MCP - Tender - - - 1   Left 2nd MCP - Swollen - - - 1   Left 3rd MCP - Tender - - - 1   Left 3rd MCP - Swollen - - - 1   Left 4th MCP - Tender - - - 1   Left 4th MCP - Swollen - - - 0   Left 5th MCP - Tender - - 1 1   Left 5th MCP - Swollen - - 0 1   Left thumb IP - Tender - - - 1   Left thumb IP - Swollen - - - 1   Left 2nd PIP - Tender - - 1 1   Left 2nd PIP - Swollen - - 1 1   Left 3rd PIP - Tender - 0 0 1   Left 3rd PIP - Swollen - 1 1 1   Left 4th PIP - Tender - 0 0 1   Left 4th PIP - Swollen - 1 1 1   Left 5th PIP - Tender - - - 1   Left 5th PIP - Swollen - - - 1   Right elbow - Tender - 1 - 1   Right elbow - Swollen - 1 - 1   Right wrist- Tender 1 1 1 1   Right wrist- Swollen 1 0 1 1   Right 1st MCP - Tender - - 1 1   Right 1st MCP - Swollen - - 1 1   Right 2nd MCP - Tender - - - 1   Right 2nd MCP - Swollen - - - 0   Right 3rd MCP - Tender - - 1 1   Right 3rd MCP - Swollen - - 1 1   Right 4th MCP - Tender - - 1 1   Right 4th MCP - Swollen - - 0 1   Right 5th MCP - Tender - - 1 1   Right 5th MCP - Swollen - - 0 1   Right thumb IP - Tender - - - 1   Right thumb IP - Swollen - - - 1   Right 2nd PIP - Tender - - 1 1   Right 2nd PIP - Swollen - - 0 1   Right 3rd PIP - Tender 0 0 1 1   Right 3rd PIP - Swollen 1 1 1 1   Right 4th PIP - Tender - 0 0 1   Right 4th PIP - Swollen - 1 1 1   Right 5th PIP - Tender 0 0 0 1   Right 5th PIP - Swollen 1 1 1 1   Right knee - Tender - - - 1   Right knee - Swollen - - - 1   Tender Joint Count (Total) 2 3 11 25   Swollen Joint Count (Total) 3 7 11 23   Physician Assessment (0-10) 2 1.5 4 5 Patient Assessment (0-10) 4 2 8 2.5   CDAI Total (calculated) 11 13.5 34 55.5       DATA REVIEW    Laboratory     Recent laboratory results were reviewed, summarized, and discussed with the patient. Imaging    Musculoskeletal Ultrasound    None    Radiographs    None    CT Imaging    None    MR Imaging    None    DXA     None    ASSESSMENT AND PLAN    This is a follow-up visit for Mr. Marco A Carmona. 1) Seropositive Rheumatoid Arthritis. He is maintained on methotrexate to 20 mg every Wednesday, and infliximab 5 mg/kg every 8 weeks, which he has taken with good tolerance. His most recent infusion was 11/29/2021. He feels much better but still has mild active complaints. His CDAI was 11 (previously 13.5, 34,  55.5) with 2 tender and 3 swollen joints, with bilateral ankle and MTP involvement, consistent with moderate disease activity. He is due for his dose next week, so likely has breakthrough. I will increase infliximab to 10 mg/kg    I refilled methotrexate. 2) Long Term Use of Immunosuppressants. The patient remains on high risk immunomodulatory medications (methotrexate, inflixiamb) and requires frequent toxicity monitoring by blood work to evaluate for toxicities. Respective labs were ordered (see below orders for details). The patient voiced understanding of the aforementioned assessment and plan. A total of 34 minutes was spent on this visit, reviewing interval notes, interval testing results, ordering tests, refilling medications, documenting the findings in the note, patient education, counseling, and coordination of care as described above. All questions asked and answered.     TODAY'S ORDERS    Orders Placed This Encounter    QUANTIFERON-TB GOLD PLUS    INFLIXIMAB (IFX) CONC+ IFX AB    METHOTREXATE POLYGLUTAMATES    HEP B SURFACE AG    HEP B SURFACE AB    HBV CORE AB, IGG/IGM    HEPATITIS C AB    HEPATITIS BE AB    methotrexate (RHEUMATREX) 2.5 mg tablet    folic acid (FOLVITE) 1 mg tablet     Future Appointments   Date Time Provider Michelle Ho   1/24/2022  8:00  [facility] [address] [facility]   4/18/2022  8:00 AM Sylvester Washburn MD AOCR BS AMB     Sam Beck MD, [address] [facility]    Adult Rheumatology   Rheumatology Ultrasound Certified  [facility]  [facility] of [facility], [address] [place] [address] Road   Phone [phone]  Fax [phone]

## 2022-01-14 NOTE — LETTER
1/14/2022    Patient: Patrick Urias   YOB: 1975   Date of Visit: 1/14/2022     Tisha Nelson MD  [facility]  [address] [facility] [address]    Dear Tisha Nelson MD,      Thank you for referring Mr. Merissa Magallanes to [address] [place] [address] for evaluation. My notes for this consultation are attached. If you have questions, please do not hesitate to call me. I look forward to following your patient along with you.       Sincerely,    Bina Robertson MD

## 2022-01-24 ENCOUNTER — HOSPITAL ENCOUNTER (OUTPATIENT)
Dept: INFUSION THERAPY | Age: 47
Discharge: HOME | End: 2022-01-24
Payer: SUBSIDIZED

## 2022-01-24 VITALS
SYSTOLIC BLOOD PRESSURE: 99 MMHG | HEIGHT: 65 IN | TEMPERATURE: 97.5 F | OXYGEN SATURATION: 99 % | WEIGHT: 158 LBS | DIASTOLIC BLOOD PRESSURE: 61 MMHG | BODY MASS INDEX: 26.33 KG/M2 | HEART RATE: 56 BPM | RESPIRATION RATE: 14 BRPM

## 2022-01-24 DIAGNOSIS — M05.79 SEROPOSITIVE RHEUMATOID ARTHRITIS OF MULTIPLE SITES (HCC): Primary | ICD-10-CM

## 2022-01-24 LAB
ALBUMIN SERPL-MCNC: 3.5 G/DL (ref 3.5–5)
ALBUMIN/GLOB SERPL: 0.8 {RATIO} (ref 1.1–2.2)
ALP SERPL-CCNC: 61 U/L (ref 45–117)
ALT SERPL-CCNC: 44 U/L (ref 12–78)
ANION GAP SERPL CALC-SCNC: 5 MMOL/L (ref 5–15)
AST SERPL-CCNC: 31 U/L (ref 15–37)
BASOPHILS # BLD: 0 K/UL (ref 0–0.1)
BASOPHILS NFR BLD: 1 % (ref 0–1)
BILIRUB SERPL-MCNC: 1.6 MG/DL (ref 0.2–1)
BUN SERPL-MCNC: 14 MG/DL (ref 6–20)
BUN/CREAT SERPL: 17 (ref 12–20)
CALCIUM SERPL-MCNC: 8.5 MG/DL (ref 8.5–10.1)
CHLORIDE SERPL-SCNC: 105 MMOL/L (ref 97–108)
CO2 SERPL-SCNC: 27 MMOL/L (ref 21–32)
CREAT SERPL-MCNC: 0.84 MG/DL (ref 0.7–1.3)
CRP SERPL-MCNC: <0.29 MG/DL (ref 0–0.6)
DIFFERENTIAL METHOD BLD: NORMAL
EOSINOPHIL # BLD: 0.2 K/UL (ref 0–0.4)
EOSINOPHIL NFR BLD: 4 % (ref 0–7)
ERYTHROCYTE [DISTWIDTH] IN BLOOD BY AUTOMATED COUNT: 12.9 % (ref 11.5–14.5)
ERYTHROCYTE [SEDIMENTATION RATE] IN BLOOD: 33 MM/HR (ref 0–15)
GLOBULIN SER CALC-MCNC: 4.3 G/DL (ref 2–4)
GLUCOSE SERPL-MCNC: 111 MG/DL (ref 65–100)
HCT VFR BLD AUTO: 43 % (ref 36.6–50.3)
HGB BLD-MCNC: 15.2 G/DL (ref 12.1–17)
IMM GRANULOCYTES # BLD AUTO: 0 K/UL (ref 0–0.04)
IMM GRANULOCYTES NFR BLD AUTO: 0 % (ref 0–0.5)
LYMPHOCYTES # BLD: 1.4 K/UL (ref 0.8–3.5)
LYMPHOCYTES NFR BLD: 30 % (ref 12–49)
MCH RBC QN AUTO: 31.1 PG (ref 26–34)
MCHC RBC AUTO-ENTMCNC: 35.3 G/DL (ref 30–36.5)
MCV RBC AUTO: 87.9 FL (ref 80–99)
MONOCYTES # BLD: 0.6 K/UL (ref 0–1)
MONOCYTES NFR BLD: 12 % (ref 5–13)
NEUTS SEG # BLD: 2.4 K/UL (ref 1.8–8)
NEUTS SEG NFR BLD: 53 % (ref 32–75)
NRBC # BLD: 0 K/UL (ref 0–0.01)
NRBC BLD-RTO: 0 PER 100 WBC
PLATELET # BLD AUTO: 221 K/UL (ref 150–400)
PMV BLD AUTO: 9.9 FL (ref 8.9–12.9)
POTASSIUM SERPL-SCNC: 3.8 MMOL/L (ref 3.5–5.1)
PROT SERPL-MCNC: 7.8 G/DL (ref 6.4–8.2)
RBC # BLD AUTO: 4.89 M/UL (ref 4.1–5.7)
SODIUM SERPL-SCNC: 137 MMOL/L (ref 136–145)
WBC # BLD AUTO: 4.6 K/UL (ref 4.1–11.1)

## 2022-01-24 PROCEDURE — 96375 TX/PRO/DX INJ NEW DRUG ADDON: CPT

## 2022-01-24 PROCEDURE — 85652 RBC SED RATE AUTOMATED: CPT

## 2022-01-24 PROCEDURE — 36415 COLL VENOUS BLD VENIPUNCTURE: CPT

## 2022-01-24 PROCEDURE — 96365 THER/PROPH/DIAG IV INF INIT: CPT

## 2022-01-24 PROCEDURE — 86140 C-REACTIVE PROTEIN: CPT

## 2022-01-24 PROCEDURE — 74011250637 HC RX REV CODE- 250/637: Performed by: INTERNAL MEDICINE

## 2022-01-24 PROCEDURE — 80053 COMPREHEN METABOLIC PANEL: CPT

## 2022-01-24 PROCEDURE — 85025 COMPLETE CBC W/AUTO DIFF WBC: CPT

## 2022-01-24 PROCEDURE — 74011250636 HC RX REV CODE- 250/636: Performed by: INTERNAL MEDICINE

## 2022-01-24 PROCEDURE — 96366 THER/PROPH/DIAG IV INF ADDON: CPT

## 2022-01-24 RX ORDER — SODIUM CHLORIDE 0.9 % (FLUSH) 0.9 %
10 SYRINGE (ML) INJECTION AS NEEDED
Status: CANCELLED | OUTPATIENT
Start: 2022-03-21 | End: 2022-03-21

## 2022-01-24 RX ORDER — SODIUM CHLORIDE 9 MG/ML
10 INJECTION INTRAMUSCULAR; INTRAVENOUS; SUBCUTANEOUS AS NEEDED
Status: CANCELLED | OUTPATIENT
Start: 2022-03-21

## 2022-01-24 RX ORDER — ACETAMINOPHEN 325 MG/1
650 TABLET ORAL ONCE
Status: CANCELLED
Start: 2022-03-21 | End: 2022-03-21

## 2022-01-24 RX ORDER — SODIUM CHLORIDE 9 MG/ML
25 INJECTION, SOLUTION INTRAVENOUS CONTINUOUS
Status: CANCELLED | OUTPATIENT
Start: 2022-03-21 | End: 2022-03-21

## 2022-01-24 RX ORDER — ALBUTEROL SULFATE 0.83 MG/ML
2.5 SOLUTION RESPIRATORY (INHALATION) AS NEEDED
Status: CANCELLED
Start: 2022-03-21

## 2022-01-24 RX ORDER — SODIUM CHLORIDE 9 MG/ML
25 INJECTION, SOLUTION INTRAVENOUS CONTINUOUS
Status: DISPENSED | OUTPATIENT
Start: 2022-01-24 | End: 2022-01-24

## 2022-01-24 RX ORDER — EPINEPHRINE 1 MG/ML
0.3 INJECTION, SOLUTION, CONCENTRATE INTRAVENOUS AS NEEDED
Status: CANCELLED | OUTPATIENT
Start: 2022-03-21

## 2022-01-24 RX ORDER — DIPHENHYDRAMINE HCL 25 MG
50 CAPSULE ORAL ONCE
Status: CANCELLED | OUTPATIENT
Start: 2022-03-21 | End: 2022-03-21

## 2022-01-24 RX ORDER — ACETAMINOPHEN 325 MG/1
650 TABLET ORAL ONCE
Status: COMPLETED | OUTPATIENT
Start: 2022-01-24 | End: 2022-01-24

## 2022-01-24 RX ORDER — DIPHENHYDRAMINE HYDROCHLORIDE 50 MG/ML
50 INJECTION, SOLUTION INTRAMUSCULAR; INTRAVENOUS AS NEEDED
Status: CANCELLED
Start: 2022-03-21

## 2022-01-24 RX ORDER — ONDANSETRON 2 MG/ML
8 INJECTION INTRAMUSCULAR; INTRAVENOUS AS NEEDED
Status: CANCELLED | OUTPATIENT
Start: 2022-03-21

## 2022-01-24 RX ORDER — ACETAMINOPHEN 325 MG/1
650 TABLET ORAL AS NEEDED
Status: CANCELLED
Start: 2022-03-21

## 2022-01-24 RX ORDER — HYDROCORTISONE SODIUM SUCCINATE 100 MG/2ML
100 INJECTION, POWDER, FOR SOLUTION INTRAMUSCULAR; INTRAVENOUS AS NEEDED
Status: CANCELLED | OUTPATIENT
Start: 2022-03-21

## 2022-01-24 RX ORDER — DIPHENHYDRAMINE HYDROCHLORIDE 50 MG/ML
25 INJECTION, SOLUTION INTRAMUSCULAR; INTRAVENOUS AS NEEDED
Status: CANCELLED
Start: 2022-03-21

## 2022-01-24 RX ORDER — HEPARIN 100 UNIT/ML
300-500 SYRINGE INTRAVENOUS AS NEEDED
Status: CANCELLED
Start: 2022-03-21

## 2022-01-24 RX ORDER — DIPHENHYDRAMINE HCL 25 MG
50 CAPSULE ORAL ONCE
Status: ACTIVE | OUTPATIENT
Start: 2022-01-24 | End: 2022-01-24

## 2022-01-24 RX ADMIN — METHYLPREDNISOLONE SODIUM SUCCINATE 40 MG: 40 INJECTION, POWDER, FOR SOLUTION INTRAMUSCULAR; INTRAVENOUS at 08:45

## 2022-01-24 RX ADMIN — INFLIXIMAB 600 MG: 100 INJECTION, POWDER, LYOPHILIZED, FOR SOLUTION INTRAVENOUS at 09:13

## 2022-01-24 RX ADMIN — ACETAMINOPHEN 650 MG: 325 TABLET ORAL at 08:43

## 2022-01-24 RX ADMIN — SODIUM CHLORIDE 25 ML/HR: 9 INJECTION, SOLUTION INTRAVENOUS at 08:39

## 2022-01-24 NOTE — PROGRESS NOTES
\[facility] [place] [facility]\"" Progress Note                                              Date: January 24, 2022       Treatment: Renflexis      Mr. You Ballard arrived in no acute distress at 0805. Patient COVID Screening completed:   Do you have any symptoms of COVID-19? SOB, coughing, fever, or generally not feeling well? NO   Have you been exposed to COVID-19 recently? NO   Have you had any recent contact with family/friend that has a pending COVID test? NO    Assessment was unremarkable with no new concerns voiced. 24G PIV established in R A/C with positive blood return noted. Problem: Knowledge Deficit  Goal: *Verbalizes understanding of procedures and medications  Outcome: Progressing Towards Goal     Problem: Patient Education:  Go to Education Activity  Goal: Patient/Family Education  Outcome: Progressing Towards Goal  Mr. Bard Winnie Govea's vitals for today's visit. Patient Vitals for the past 12 hrs:   Temp Pulse Resp BP SpO2   01/24/22 1112 -- (!) 56 14 99/61 --   01/24/22 0810 97.5 °F (36.4 °C) 68 16 110/64 99 %         Lab results:  Recent Results (from the past 12 hour(s))   CBC WITH AUTOMATED DIFF    Collection Time: 01/24/22  8:21 AM   Result Value Ref Range    WBC 4.6 4.1 - 11.1 K/uL    RBC 4.89 4. 10 - 5.70 M/uL    HGB 15.2 12.1 - 17.0 g/dL    HCT 43.0 36.6 - 50.3 %    MCV 87.9 80.0 - 99.0 FL    MCH 31.1 26.0 - 34.0 PG    MCHC 35.3 30.0 - 36.5 g/dL    RDW 12.9 11.5 - 14.5 %    PLATELET 526 013 - 916 K/uL    MPV 9.9 8.9 - 12.9 FL    NRBC 0.0 0  WBC    ABSOLUTE NRBC 0.00 0.00 - 0.01 K/uL    NEUTROPHILS 53 32 - 75 %    LYMPHOCYTES 30 12 - 49 %    MONOCYTES 12 5 - 13 %    EOSINOPHILS 4 0 - 7 %    BASOPHILS 1 0 - 1 %    IMMATURE GRANULOCYTES 0 0.0 - 0.5 %    ABS. NEUTROPHILS 2.4 1.8 - 8.0 K/UL    ABS. LYMPHOCYTES 1.4 0.8 - 3.5 K/UL    ABS. MONOCYTES 0.6 0.0 - 1.0 K/UL    ABS. EOSINOPHILS 0.2 0.0 - 0.4 K/UL    ABS. BASOPHILS 0.0 0.0 - 0.1 K/UL    ABS. IMM.  GRANS. 0.0 0.00 - 0.04 K/UL    DF AUTOMATED METABOLIC PANEL, COMPREHENSIVE    Collection Time: 01/24/22  8:21 AM   Result Value Ref Range    Sodium 137 136 - 145 mmol/L    Potassium 3.8 3.5 - 5.1 mmol/L    Chloride 105 97 - 108 mmol/L    CO2 27 21 - 32 mmol/L    Anion gap 5 5 - 15 mmol/L    Glucose 111 (H) 65 - 100 mg/dL    BUN 14 6 - 20 MG/DL    Creatinine 0.84 0.70 - 1.30 MG/DL    BUN/Creatinine ratio 17 12 - 20      GFR est AA >60 >60 ml/min/1.73m2    GFR est non-AA >60 >60 ml/min/1.73m2    Calcium 8.5 8.5 - 10.1 MG/DL    Bilirubin, total 1.6 (H) 0.2 - 1.0 MG/DL    ALT (SGPT) 44 12 - 78 U/L    AST (SGOT) 31 15 - 37 U/L    Alk. phosphatase 61 45 - 117 U/L    Protein, total 7.8 6.4 - 8.2 g/dL    Albumin 3.5 3.5 - 5.0 g/dL    Globulin 4.3 (H) 2.0 - 4.0 g/dL    A-G Ratio 0.8 (L) 1.1 - 2.2     SED RATE (ESR)    Collection Time: 01/24/22  8:21 AM   Result Value Ref Range    Sed rate, automated 33 (H) 0 - 15 mm/hr       Medications given:  Medications Administered     0.9% sodium chloride infusion     Admin Date  01/24/2022 Action  New Bag Dose  25 mL/hr Rate  25 mL/hr Route  IntraVENous Administered By  Milton Yanez RN          acetaminophen (TYLENOL) tablet 650 mg     Admin Date  01/24/2022 Action  Given Dose  650 mg Route  Oral Administered By  Milton Yanez RN          inFLIXimab-abda (RENFLEXIS) 600 mg in 0.9% sodium chloride 250 mL, overfill volume 25 mL infusion     Admin Date  01/24/2022 Action  New Bag Dose  600 mg Route  IntraVENous Administered By  Mitlon Yanez RN          methylPREDNISolone (PF) (SOLU-MEDROL) injection 40 mg     Admin Date  01/24/2022 Action  Given Dose  40 mg Route  IntraVENous Administered By  Milton Yanez RN                Patient refused Benadryl stating it makes him sleep for 24 hrs. Mr. Mary Grimm tolerated the treatment without complaints. PIV flushed and removed, 2x2 and coban placed. Mr. Mary Grimm was discharged from [place] [address] in stable condition at 1115.      Future Appointments   Date Time Provider Michelle Ho   3/21/2022  8:00 AM [facility] INFUSION NURSE 1 [address] [facility]   4/18/2022  8:00 AM Johanne Key MD AOCR BS AMB       Pratibha Hernández RN  January 24, 2022  8:52 AM

## 2022-01-24 NOTE — PROGRESS NOTES
The results were reviewed. Elevated bilirubin 1.6, likely Gilbert's versus infliximab. Will monitor but will recommend US liver if persistent. Remaining labs are good.

## 2022-01-24 NOTE — DISCHARGE INSTRUCTIONS
Patient Education   Infliximab-abda (By injection)   Infliximab-abda (in-FLIX-i-mab - abda)  Treats rheumatoid arthritis, psoriatic arthritis, ankylosing spondylitis, Crohn disease, plaque psoriasis, and ulcerative colitis. Brand Name(s): Renflexis   There may be other brand names for this medicine. When This Medicine Should Not Be Used: This medicine is not right for everyone. You should not receive it if you had an allergic reaction to infliximab products or murine (mouse) proteins. How to Use This Medicine:   Injectable  · Your doctor will prescribe your dose and schedule. This medicine is given through a needle placed in a vein. This medicine needs to be given slowly. The needle will remain in place for at least 2 hours. · A nurse or other health provider will give you this medicine. · This medicine should come with a Medication Guide. Ask your pharmacist for a copy if you do not have one. Drugs and Foods to Avoid:   Ask your doctor or pharmacist before using any other medicine, including over-the-counter medicines, vitamins, and herbal products. · Some foods and medicines may affect how infliximab-abda works. Tell your doctor if you are using any of the following:  ¨ Abatacept, anakinra, cyclosporine, etanercept, theophylline, tocilizumab  ¨ Blood thinner (including warfarin)  ¨ Medicine that weakens immune system (including methotrexate or a steroid)  · Tell your doctor if you have had light treatment for psoriasis. · You should not receive a vaccine without your doctor's approval. A live virus vaccine could cause an infection. Children should be current on all vaccines before they start treatment with this medicine. If you received infliximab-abda while pregnant, tell the baby's doctor.   Warnings While Using This Medicine:   · Tell your doctor if you are pregnant or breastfeeding, or if you have heart failure, COPD, liver disease, a bleeding disorder, blood or bone marrow problems, cancer, or heart disease. Tell your doctor if you have a history of seizures, multiple sclerosis, Guillain-Barré syndrome, or a similar nervous system disease. · This medicine may cause the following problems:  ¨ Higher risk of lymphoma or other cancers (including skin cancer)  ¨ Liver damage  ¨ Infusion reaction during or after the infusion, or if you start using the medicine again after not receiving it for a long time  ¨ Lupus-like syndrome  · This medicine may make you bleed, bruise, or get infections more easily. Take precautions to prevent illness and injury. Wash your hands often. · This medicine increases your risk of infection, which could result in serious or life-threatening illness. Tell your doctor if you have a history of frequent or serious infections, including tuberculosis or hepatitis B. Make sure your doctor knows if have an infection or already have trouble with your immune system. This risk may be higher in people who are older than 65 years or who have diabetes. Avoid sick people, and wash your hands often. · Your doctor will check your progress and the effects of this medicine at regular visits. Keep all appointments.   Possible Side Effects While Using This Medicine:   Call your doctor right away if you notice any of these side effects:  · Allergic reaction: Itching or hives, swelling in your face or hands, swelling or tingling in your mouth or throat, chest tightness, trouble breathing  · Dark urine or pale stools, nausea, vomiting, loss of appetite, stomach pain, yellow skin or eyes  · Fever, chills, cough, runny or stuffy nose, sore throat, body aches  · Joint or muscle pain or swelling, trouble swallowing  · Headache, lightheadedness, trouble breathing, rash  · Seizures, numbness, tingling, problems with vision, speech, or walking  · Unusual bleeding, bruising, tiredness, or weakness  · Warm, red, swollen, or painful skin, blisters, skin sores  If you notice these less serious side effects, talk with your doctor:   · Redness, pain, or swelling where the needle is placed  If you notice other side effects that you think are caused by this medicine, tell your doctor. Call your doctor for medical advice about side effects. You may report side effects to FDA at 7-735-UNR-1092  © 2017 [facility] Information is for End User's use only and may not be sold, redistributed or otherwise used for commercial purposes. The above information is an  only. It is not intended as medical advice for individual conditions or treatments. Talk to your doctor, nurse or pharmacist before following any medical regimen to see if it is safe and effective for you.

## 2022-03-21 ENCOUNTER — HOSPITAL ENCOUNTER (OUTPATIENT)
Dept: INFUSION THERAPY | Age: 47
Discharge: HOME | End: 2022-03-21
Attending: INTERNAL MEDICINE
Payer: SUBSIDIZED

## 2022-03-21 VITALS
RESPIRATION RATE: 16 BRPM | HEIGHT: 65 IN | TEMPERATURE: 98.7 F | OXYGEN SATURATION: 98 % | DIASTOLIC BLOOD PRESSURE: 73 MMHG | SYSTOLIC BLOOD PRESSURE: 107 MMHG | BODY MASS INDEX: 26.41 KG/M2 | HEART RATE: 61 BPM | WEIGHT: 158.5 LBS

## 2022-03-21 DIAGNOSIS — M05.79 SEROPOSITIVE RHEUMATOID ARTHRITIS OF MULTIPLE SITES (HCC): Primary | ICD-10-CM

## 2022-03-21 LAB
ALBUMIN SERPL-MCNC: 3.7 G/DL (ref 3.5–5)
ALBUMIN/GLOB SERPL: 0.9 {RATIO} (ref 1.1–2.2)
ALP SERPL-CCNC: 66 U/L (ref 45–117)
ALT SERPL-CCNC: 44 U/L (ref 12–78)
ANION GAP SERPL CALC-SCNC: 10 MMOL/L (ref 5–15)
AST SERPL-CCNC: 11 U/L (ref 15–37)
BASOPHILS # BLD: 0 K/UL (ref 0–0.1)
BASOPHILS NFR BLD: 1 % (ref 0–1)
BILIRUB SERPL-MCNC: 1.1 MG/DL (ref 0.2–1)
BUN SERPL-MCNC: 14 MG/DL (ref 6–20)
BUN/CREAT SERPL: 15 (ref 12–20)
CALCIUM SERPL-MCNC: 8.4 MG/DL (ref 8.5–10.1)
CHLORIDE SERPL-SCNC: 103 MMOL/L (ref 97–108)
CO2 SERPL-SCNC: 27 MMOL/L (ref 21–32)
CREAT SERPL-MCNC: 0.95 MG/DL (ref 0.7–1.3)
CRP SERPL-MCNC: <0.29 MG/DL (ref 0–0.6)
DIFFERENTIAL METHOD BLD: ABNORMAL
EOSINOPHIL # BLD: 0.1 K/UL (ref 0–0.4)
EOSINOPHIL NFR BLD: 1 % (ref 0–7)
ERYTHROCYTE [DISTWIDTH] IN BLOOD BY AUTOMATED COUNT: 13.1 % (ref 11.5–14.5)
ERYTHROCYTE [SEDIMENTATION RATE] IN BLOOD: 49 MM/HR (ref 0–15)
GLOBULIN SER CALC-MCNC: 4.1 G/DL (ref 2–4)
GLUCOSE SERPL-MCNC: 64 MG/DL (ref 65–100)
HCT VFR BLD AUTO: 42.8 % (ref 36.6–50.3)
HGB BLD-MCNC: 14.6 G/DL (ref 12.1–17)
IMM GRANULOCYTES # BLD AUTO: 0 K/UL (ref 0–0.04)
IMM GRANULOCYTES NFR BLD AUTO: 0 % (ref 0–0.5)
LYMPHOCYTES # BLD: 1.2 K/UL (ref 0.8–3.5)
LYMPHOCYTES NFR BLD: 28 % (ref 12–49)
MCH RBC QN AUTO: 30.1 PG (ref 26–34)
MCHC RBC AUTO-ENTMCNC: 34.1 G/DL (ref 30–36.5)
MCV RBC AUTO: 88.2 FL (ref 80–99)
MONOCYTES # BLD: 0.6 K/UL (ref 0–1)
MONOCYTES NFR BLD: 15 % (ref 5–13)
NEUTS SEG # BLD: 2.3 K/UL (ref 1.8–8)
NEUTS SEG NFR BLD: 55 % (ref 32–75)
NRBC # BLD: 0 K/UL (ref 0–0.01)
NRBC BLD-RTO: 0 PER 100 WBC
PLATELET # BLD AUTO: 249 K/UL (ref 150–400)
PMV BLD AUTO: 9.8 FL (ref 8.9–12.9)
POTASSIUM SERPL-SCNC: 4 MMOL/L (ref 3.5–5.1)
PROT SERPL-MCNC: 7.8 G/DL (ref 6.4–8.2)
RBC # BLD AUTO: 4.85 M/UL (ref 4.1–5.7)
SODIUM SERPL-SCNC: 140 MMOL/L (ref 136–145)
WBC # BLD AUTO: 4.2 K/UL (ref 4.1–11.1)

## 2022-03-21 PROCEDURE — 96375 TX/PRO/DX INJ NEW DRUG ADDON: CPT

## 2022-03-21 PROCEDURE — 96415 CHEMO IV INFUSION ADDL HR: CPT

## 2022-03-21 PROCEDURE — 36415 COLL VENOUS BLD VENIPUNCTURE: CPT

## 2022-03-21 PROCEDURE — 85652 RBC SED RATE AUTOMATED: CPT

## 2022-03-21 PROCEDURE — 86140 C-REACTIVE PROTEIN: CPT

## 2022-03-21 PROCEDURE — 74011000250 HC RX REV CODE- 250: Performed by: INTERNAL MEDICINE

## 2022-03-21 PROCEDURE — 80053 COMPREHEN METABOLIC PANEL: CPT

## 2022-03-21 PROCEDURE — 74011250637 HC RX REV CODE- 250/637: Performed by: INTERNAL MEDICINE

## 2022-03-21 PROCEDURE — 85025 COMPLETE CBC W/AUTO DIFF WBC: CPT

## 2022-03-21 PROCEDURE — 96413 CHEMO IV INFUSION 1 HR: CPT

## 2022-03-21 PROCEDURE — 74011250636 HC RX REV CODE- 250/636: Performed by: INTERNAL MEDICINE

## 2022-03-21 RX ORDER — ALBUTEROL SULFATE 0.83 MG/ML
2.5 SOLUTION RESPIRATORY (INHALATION) AS NEEDED
Status: CANCELLED
Start: 2022-05-16

## 2022-03-21 RX ORDER — DIPHENHYDRAMINE HCL 25 MG
50 CAPSULE ORAL ONCE
Status: ACTIVE | OUTPATIENT
Start: 2022-03-21 | End: 2022-03-21

## 2022-03-21 RX ORDER — ONDANSETRON 2 MG/ML
8 INJECTION INTRAMUSCULAR; INTRAVENOUS AS NEEDED
Status: CANCELLED | OUTPATIENT
Start: 2022-05-16

## 2022-03-21 RX ORDER — SODIUM CHLORIDE 9 MG/ML
25 INJECTION, SOLUTION INTRAVENOUS CONTINUOUS
Status: DISPENSED | OUTPATIENT
Start: 2022-03-21 | End: 2022-03-21

## 2022-03-21 RX ORDER — EPINEPHRINE 1 MG/ML
0.3 INJECTION, SOLUTION, CONCENTRATE INTRAVENOUS AS NEEDED
Status: CANCELLED | OUTPATIENT
Start: 2022-05-16

## 2022-03-21 RX ORDER — DIPHENHYDRAMINE HYDROCHLORIDE 50 MG/ML
25 INJECTION, SOLUTION INTRAMUSCULAR; INTRAVENOUS AS NEEDED
Status: CANCELLED
Start: 2022-05-16

## 2022-03-21 RX ORDER — SODIUM CHLORIDE 9 MG/ML
25 INJECTION, SOLUTION INTRAVENOUS CONTINUOUS
Status: CANCELLED | OUTPATIENT
Start: 2022-05-16 | End: 2022-05-16

## 2022-03-21 RX ORDER — DIPHENHYDRAMINE HYDROCHLORIDE 50 MG/ML
50 INJECTION, SOLUTION INTRAMUSCULAR; INTRAVENOUS AS NEEDED
Status: CANCELLED
Start: 2022-05-16

## 2022-03-21 RX ORDER — SODIUM CHLORIDE 0.9 % (FLUSH) 0.9 %
10 SYRINGE (ML) INJECTION AS NEEDED
Status: DISPENSED | OUTPATIENT
Start: 2022-03-21 | End: 2022-03-21

## 2022-03-21 RX ORDER — ACETAMINOPHEN 325 MG/1
650 TABLET ORAL AS NEEDED
Status: CANCELLED
Start: 2022-05-16

## 2022-03-21 RX ORDER — HYDROCORTISONE SODIUM SUCCINATE 100 MG/2ML
100 INJECTION, POWDER, FOR SOLUTION INTRAMUSCULAR; INTRAVENOUS AS NEEDED
Status: CANCELLED | OUTPATIENT
Start: 2022-05-16

## 2022-03-21 RX ORDER — HEPARIN 100 UNIT/ML
300-500 SYRINGE INTRAVENOUS AS NEEDED
Status: CANCELLED
Start: 2022-05-16

## 2022-03-21 RX ORDER — ACETAMINOPHEN 325 MG/1
650 TABLET ORAL ONCE
Status: CANCELLED
Start: 2022-05-16 | End: 2022-05-16

## 2022-03-21 RX ORDER — SODIUM CHLORIDE 9 MG/ML
10 INJECTION INTRAMUSCULAR; INTRAVENOUS; SUBCUTANEOUS AS NEEDED
Status: CANCELLED | OUTPATIENT
Start: 2022-05-16

## 2022-03-21 RX ORDER — SODIUM CHLORIDE 0.9 % (FLUSH) 0.9 %
10 SYRINGE (ML) INJECTION AS NEEDED
Status: CANCELLED | OUTPATIENT
Start: 2022-05-16 | End: 2022-05-16

## 2022-03-21 RX ORDER — DIPHENHYDRAMINE HCL 25 MG
50 CAPSULE ORAL ONCE
Status: CANCELLED | OUTPATIENT
Start: 2022-05-16 | End: 2022-05-16

## 2022-03-21 RX ORDER — ACETAMINOPHEN 325 MG/1
650 TABLET ORAL ONCE
Status: COMPLETED | OUTPATIENT
Start: 2022-03-21 | End: 2022-03-21

## 2022-03-21 RX ADMIN — SODIUM CHLORIDE 25 ML/HR: 9 INJECTION, SOLUTION INTRAVENOUS at 08:52

## 2022-03-21 RX ADMIN — INFLIXIMAB 600 MG: 100 INJECTION, POWDER, LYOPHILIZED, FOR SOLUTION INTRAVENOUS at 09:19

## 2022-03-21 RX ADMIN — METHYLPREDNISOLONE SODIUM SUCCINATE 40 MG: 40 INJECTION, POWDER, FOR SOLUTION INTRAMUSCULAR; INTRAVENOUS at 08:53

## 2022-03-21 RX ADMIN — ACETAMINOPHEN 650 MG: 325 TABLET ORAL at 08:48

## 2022-03-21 RX ADMIN — Medication 10 ML: at 08:30

## 2022-03-21 NOTE — PROGRESS NOTES
[facility] [place] Progress Note  Date: March 21, 2022       Treatment: Renflexis      Mr. Emil Shrestha arrived in no acute distress at 0805. Patient COVID Screening completed:   Do you have any symptoms of COVID-19? SOB, coughing, fever, or generally not feeling well? NO   Have you been exposed to COVID-19 recently? NO   Have you had any recent contact with family/friend that has a pending COVID test? NO    Assessment was unremarkable with no new concerns voiced. 24G PIV established in R A/C with positive blood return noted. Problem: Knowledge Deficit  Goal: *Verbalizes understanding of procedures and medications  Outcome: Progressing Towards Goal     Problem: Patient Education:  Go to Education Activity  Goal: Patient/Family Education  Outcome: Progressing Towards Goal    Mr. Joaquín Govea's vitals for today's visit. Patient Vitals for the past 12 hrs:   Temp Pulse Resp BP SpO2   03/21/22 1126 -- 61 -- 107/73 --   03/21/22 0807 98.7 °F (37.1 °C) 76 16 115/72 98 %       Lab results:  Recent Results (from the past 12 hour(s))   CBC WITH AUTOMATED DIFF    Collection Time: 03/21/22  8:33 AM   Result Value Ref Range    WBC 4.2 4.1 - 11.1 K/uL    RBC 4.85 4.10 - 5.70 M/uL    HGB 14.6 12.1 - 17.0 g/dL    HCT 42.8 36.6 - 50.3 %    MCV 88.2 80.0 - 99.0 FL    MCH 30.1 26.0 - 34.0 PG    MCHC 34.1 30.0 - 36.5 g/dL    RDW 13.1 11.5 - 14.5 %    PLATELET 747 537 - 551 K/uL    MPV 9.8 8.9 - 12.9 FL    NRBC 0.0 0  WBC    ABSOLUTE NRBC 0.00 0.00 - 0.01 K/uL    NEUTROPHILS 55 32 - 75 %    LYMPHOCYTES 28 12 - 49 %    MONOCYTES 15 (H) 5 - 13 %    EOSINOPHILS 1 0 - 7 %    BASOPHILS 1 0 - 1 %    IMMATURE GRANULOCYTES 0 0.0 - 0.5 %    ABS. NEUTROPHILS 2.3 1.8 - 8.0 K/UL    ABS. LYMPHOCYTES 1.2 0.8 - 3.5 K/UL    ABS. MONOCYTES 0.6 0.0 - 1.0 K/UL    ABS. EOSINOPHILS 0.1 0.0 - 0.4 K/UL    ABS. BASOPHILS 0.0 0.0 - 0.1 K/UL    ABS. IMM.  GRANS. 0.0 0.00 - 0.04 K/UL    DF AUTOMATED     METABOLIC PANEL, COMPREHENSIVE    Collection Time: 03/21/22  8:33 AM   Result Value Ref Range    Sodium 140 136 - 145 mmol/L    Potassium 4.0 3.5 - 5.1 mmol/L    Chloride 103 97 - 108 mmol/L    CO2 27 21 - 32 mmol/L    Anion gap 10 5 - 15 mmol/L    Glucose 64 (L) 65 - 100 mg/dL    BUN 14 6 - 20 MG/DL    Creatinine 0.95 0.70 - 1.30 MG/DL    BUN/Creatinine ratio 15 12 - 20      GFR est AA >60 >60 ml/min/1.73m2    GFR est non-AA >60 >60 ml/min/1.73m2    Calcium 8.4 (L) 8.5 - 10.1 MG/DL    Bilirubin, total 1.1 (H) 0.2 - 1.0 MG/DL    ALT (SGPT) 44 12 - 78 U/L    AST (SGOT) 11 (L) 15 - 37 U/L    Alk. phosphatase 66 45 - 117 U/L    Protein, total 7.8 6.4 - 8.2 g/dL    Albumin 3.7 3.5 - 5.0 g/dL    Globulin 4.1 (H) 2.0 - 4.0 g/dL    A-G Ratio 0.9 (L) 1.1 - 2.2     SED RATE (ESR)    Collection Time: 03/21/22  8:33 AM   Result Value Ref Range    Sed rate, automated 49 (H) 0 - 15 mm/hr       Medications given:  Medications Administered     0.9% sodium chloride infusion     Admin Date  03/21/2022 Action  New Bag Dose  25 mL/hr Rate  25 mL/hr Route  IntraVENous Administered By  Chantel Lawrence RN          acetaminophen (TYLENOL) tablet 650 mg     Admin Date  03/21/2022 Action  Given Dose  650 mg Route  Oral Administered By  Chantel Lawrence RN          inFLIXimab-abda (RENFLEXIS) 600 mg in 0.9% sodium chloride 250 mL, overfill volume 25 mL infusion     Admin Date  03/21/2022 Action  New Bag Dose  600 mg Route  IntraVENous Administered By  Chantel Lawrence RN          methylPREDNISolone (PF) (SOLU-MEDROL) injection 40 mg     Admin Date  03/21/2022 Action  Given Dose  40 mg Route  IntraVENous Administered By  Chantel Lawrence RN          sodium chloride (NS) flush 10 mL     Admin Date  03/21/2022 Action  Given Dose  10 mL Route  IntraVENous Administered By  Chantel Lawrence RN              Patient refused Benadryl. Mr. Melinda Stearns tolerated the treatment without complaints. PIV flushed and removed, 2x2 and coban placed.     Mr. Melinda Stearns was discharged from [address] in stable condition at 1130.      Future Appointments   Date Time Provider Michelle Ho   4/18/2022  8:00 AM Isabella Perdomo MD [facility] BS AMB   5/16/2022  8:00 AM [facility] INFUSION NURSE 1 [address] [facility]       Marietta Gaona RN  March 21, 2022  8:27 AM

## 2022-03-21 NOTE — DISCHARGE INSTRUCTIONS
Patient Education   Infliximab (By injection)   Infliximab (in-FLIX-i-mab)  Treats rheumatoid arthritis, psoriatic arthritis, ankylosing spondylitis, Crohn disease, plaque psoriasis, and ulcerative colitis. Brand Name(s): Inflectra, Remicade, Renflexis   There may be other brand names for this medicine. When This Medicine Should Not Be Used: This medicine is not right for everyone. You should not receive it if you had an allergic reaction to infliximab or murine (mouse) proteins. How to Use This Medicine:   Injectable  · Your doctor will prescribe your dose and schedule. This medicine is given through a needle placed in a vein. This medicine needs to be given slowly. The needle will remain in place for at least 2 hours. · A nurse or other health provider will give you this medicine. · This medicine should come with a Medication Guide. Ask your pharmacist for a copy if you do not have one. Drugs and Foods to Avoid:   Ask your doctor or pharmacist before using any other medicine, including over-the-counter medicines, vitamins, and herbal products. · Some foods and medicines may affect how infliximab works. Tell your doctor if you are using any of the following:  ¨ Abatacept, anakinra, cyclosporine, etanercept, theophylline, tocilizumab  ¨ Blood thinner (including warfarin)  ¨ Medicine that weakens immune system (including methotrexate or a steroid)  · Tell your doctor if you have had light treatment for psoriasis. · You should not receive a vaccine without your doctor's approval. A live virus vaccine could cause an infection. Children should be current on all vaccines before they start treatment with this medicine. If you received infliximab while pregnant, tell the baby's doctor. Warnings While Using This Medicine:   · Tell your doctor if you are pregnant or breastfeeding, or if you have heart failure, COPD, liver disease, a bleeding disorder, blood or bone marrow problems, cancer, or heart disease.  Tell your doctor if you have a history of seizures, multiple sclerosis, Guillain-Barré syndrome, or a similar nervous system disease. · This medicine may cause the following problems:  ¨ Higher risk of lymphoma or other cancers (including skin cancer)  ¨ Liver damage  ¨ Infusion reaction during or after the infusion, or if you start using the medicine again after not receiving it for a long time  ¨ Low blood cell counts  · This medicine increases your risk of infection, which could result in serious or life-threatening illness. Tell your doctor if you have a history of frequent or serious infections, including tuberculosis or hepatitis B. Make sure your doctor knows if have an infection or already have trouble with your immune system. This risk may be higher in people who are older than 65 years or who have diabetes. Avoid sick people, and wash your hands often. · Your doctor will check your progress and the effects of this medicine at regular visits. Keep all appointments.   Possible Side Effects While Using This Medicine:   Call your doctor right away if you notice any of these side effects:  · Allergic reaction: Itching or hives, swelling in your face or hands, swelling or tingling in your mouth or throat, chest tightness, trouble breathing  · Change in how much or how often you urinate, pain while urinating  · Dark urine or pale stools, nausea, vomiting, loss of appetite, stomach pain, yellow skin or eyes  · Fever, chills, cough, runny or stuffy nose, sore throat, body aches  · Fever, rash, headache, sore throat, joint or muscle pain, swelling, trouble swallowing  · Headache, lightheadedness, fever, chills, nausea, trouble breathing, rash  · Seizures, numbness, tingling, problems with vision, speech, or walking  · Unusual bleeding, bruising, tiredness, or weakness  · Warm, red, swollen, or painful skin, blisters, skin sores  If you notice these less serious side effects, talk with your doctor:   · Redness, pain, or swelling where the needle is placed  If you notice other side effects that you think are caused by this medicine, tell your doctor. Call your doctor for medical advice about side effects. You may report side effects to FDA at 3-568-BQF-6422  © 2017 [facility] Information is for End User's use only and may not be sold, redistributed or otherwise used for commercial purposes. The above information is an  only. It is not intended as medical advice for individual conditions or treatments. Talk to your doctor, nurse or pharmacist before following any medical regimen to see if it is safe and effective for you.

## 2022-04-18 ENCOUNTER — OFFICE VISIT (OUTPATIENT)
Dept: RHEUMATOLOGY | Age: 47
End: 2022-04-18
Payer: SUBSIDIZED

## 2022-04-18 VITALS
TEMPERATURE: 97.9 F | DIASTOLIC BLOOD PRESSURE: 65 MMHG | BODY MASS INDEX: 26.03 KG/M2 | OXYGEN SATURATION: 96 % | HEART RATE: 63 BPM | HEIGHT: 66 IN | SYSTOLIC BLOOD PRESSURE: 112 MMHG | RESPIRATION RATE: 16 BRPM | WEIGHT: 162 LBS

## 2022-04-18 DIAGNOSIS — Z79.60 LONG-TERM USE OF IMMUNOSUPPRESSANT MEDICATION: ICD-10-CM

## 2022-04-18 DIAGNOSIS — M05.79 SEROPOSITIVE RHEUMATOID ARTHRITIS OF MULTIPLE SITES (HCC): Primary | ICD-10-CM

## 2022-04-18 DIAGNOSIS — M15.9 PRIMARY OSTEOARTHRITIS INVOLVING MULTIPLE JOINTS: ICD-10-CM

## 2022-04-18 PROCEDURE — 99215 OFFICE O/P EST HI 40 MIN: CPT | Performed by: INTERNAL MEDICINE

## 2022-04-18 RX ORDER — METHOTREXATE 2.5 MG/1
15 TABLET ORAL
Qty: 72 TABLET | Refills: 0 | Status: SHIPPED | OUTPATIENT
Start: 2022-04-20

## 2022-04-18 RX ORDER — DICLOFENAC SODIUM 75 MG/1
75 TABLET, DELAYED RELEASE ORAL
Qty: 60 TABLET | Refills: 5 | Status: SHIPPED | OUTPATIENT
Start: 2022-04-18

## 2022-04-18 NOTE — PROGRESS NOTES
46yo with RF+,CCP+ rheumatoid arthritis with low disease activity on methotrexate and infliximab 10mg/kg every 8 weeks. Decreasing dose of methotrexate for mild side effects and good joint control.

## 2022-04-18 NOTE — PATIENT INSTRUCTIONS
1) Continue your remicade infusions once every 2 months. 2) I am going to drop your methotrexate to 6 pills once every week. Keep taking your folic acid pill daily. 3) Keep updating your labs with every remicade infusion. 4) I will prescribe you diclofenac to take as needed for pain. 5) Follow up in 4 months.

## 2022-04-18 NOTE — LETTER
4/18/2022    Patient: Sachin Victoria   YOB: 1975   Date of Visit: 4/18/2022     Ajith Fung MD  [facility]  Via In Basket    Dear Ajith Fung MD,    We recently saw Mr. Jonnie Hanley in the [facility] for evaluation. My notes for this consultation are attached. If you have questions, please do not hesitate to call me. I look forward to following your patient along with you.       Sincerely,    Bon Jimenez MD S  Cell: [phone]

## 2022-04-18 NOTE — PROGRESS NOTES
REASON FOR VISIT    This is a follow-up visit for Mr. Ford Looney for     ICD-10-CM   1. Seropositive rheumatoid arthritis of multiple sites ([facility]  M05.79     Inflammatory arthritis phenotype includes:  Anti-CCP positive: yes (186)  Rheumatoid factor positive: yes (>600)  Erosive disease: N/A  Extra-articular manifestations include: none    Immunosuppression Screening (3/19/2021): Quantiferon TB: negative  PPD:  Not performed  Hepatitis B: negative  Hepatitis C: negative    Therapy History includes:  Current DMARD therapy includes: methotrexate 20 mg every Wednesday (3/04/2021 to present), infliximab 5 mg/kg every 8 weeks (6/21/2021-1/2022), 10mg/kg every 8 weeks (1/22- present)  Prior DMARD therapy includes:  hydroxychloroquine 400 mg daily (2/26/2021 to 8/31/2021), methotrexate 12.5 mg every Wednesday (2/26/2021 to 3/04/2021)  The following DMARDs have been ineffective: none  The following DMARDs were stopped because of side effects: none    HISTORY OF PRESENT ILLNESS    Mr. Ford Looney returns for a follow-up. 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 Pt is currently on methotrexate (8 pills per week) and receiving remicade infusions once every two months. Pt takes NSAIDs very infrequently (twice a year). Pt reports that he feels a lot better now after receiving his infusions. He notes that he starts to feel increased joint pain about 3 days prior to getting his next infusion. He denies nausea associated with his infusions. He does well taking his infusions without benadryl. Pt received steroids with his infusions in the past.     Pt reports that the methotrexate makes him feel a little bit of nausea the day after taking it. Also frequent nasal congestion the day after, uses vicks vapor rub and symptoms clear quickly. Pt reports that he does not have much trouble warming up in the morning. He does note of some pain in his hands and ankles, but the pain is not debilitating.       Pt reports that he gets abnormal feverish episodes that last for 3 or 4 hours. These have resolved since starting infliximab. Pt report of dry mouth, especially at night. Pt denies any infections since his last visit. 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 Takes more breaks during the day due to fatigue or particularly finger pain when using a hammer. REVIEW OF SYSTEMS    A comprehensive review of systems was performed and pertinent results are documented in the HPI, review of systems is otherwise non-contributory. PAST MEDICAL HISTORY    He has a past medical history of Rheumatoid arteritis ([facility] (01/2021). FAMILY HISTORY    His family history includes Osteoporosis in his mother. SOCIAL HISTORY    He reports that he quit smoking about a year ago. He has never used smokeless tobacco. He reports current alcohol use of about 2.0 standard drinks of alcohol per week. He reports previous drug use. MEDICATIONS    Current Outpatient Medications   Medication Sig    diclofenac EC (VOLTAREN) 75 mg EC tablet Take 1 Tablet by mouth two (2) times daily as needed for Pain.  methotrexate (RHEUMATREX) 2.5 mg tablet Take 8 Tablets by mouth every Wednesday.  folic acid (FOLVITE) 1 mg tablet Take 1 Tablet by mouth daily.  INFLIXIMAB IV 5 mg/kg by IntraVENous route every two (2) months. No current facility-administered medications for this visit. ALLERGIES    No Known Allergies    PHYSICAL EXAMINATION    Visit Vitals  /65 (BP 1 Location: Right arm, BP Patient Position: Sitting)   Pulse 63   Temp 97.9 °F (36.6 °C) (Oral)   Resp 16   Ht 5' 5.5\" (1.664 m)   Wt 162 lb (73.5 kg)   SpO2 96%   BMI 26.55 kg/m²     Body mass index is 26.55 kg/m². General:  The patient is well developed, well nourished, alert, and in no apparent distress. Eyes: Sclera are anicteric. No conjunctival injection. HEENT:  Oropharynx is clear. No oral ulcers. Adequate salivary pooling.  No cervical or supraclavicular lymphadenopathy. Lungs:  Clear to auscultation bilaterally, without wheeze or stridor. Normal respiratory effort. Cor:  Regular rate and rhythm. No murmur rub or gallop. Abdomen: Soft, non-tender, without hepatomegaly or masses. Extremities: No calf tenderness or edema. Warm and well perfused. Skin:  No significant abnormalities. Neuro: Nonfocal  Musculoskeletal:    A comprehensive musculoskeletal exam was performed for all joints of each upper and lower extremity and assessed for swelling, tenderness and range of motion. Results are documented as below: Early heberden and kunal nodes in both first 2 fingers. L 5th finger fixed flexion deformity. Chronic soft tissue prominence of L ankle without warmth or tenderness. No evidence of synovitis in the small joints of the hands, wrists, shoulders, elbows, hips, knees or ankles. DATA REVIEW    Laboratory   3/21/22: CRP <0.29, ESR 49, Cr 0.95, AST 11, alk phos 66, ALT 44, Tbilli 1.1, WBC 4.2, HGB 14.6, Plt 249,   Recent laboratory results were reviewed, summarized, and discussed with the patient. Imaging    Musculoskeletal Ultrasound    None    Radiographs    None    CT Imaging    None    MR Imaging    None    DXA     None    ASSESSMENT AND PLAN    46yo with RF+,CCP+ rheumatoid arthritis with low disease activity on methotrexate and infliximab 10mg/kg every 8 weeks. Decreasing methotrexate to 15mg weekly, adding diclofenac for component of osteoarthritis. 1. Seropositive rheumatoid arthritis of multiple sites (HCC)  - Cont infliximab 10mg/kg every 8 weeks  - diclofenac EC (VOLTAREN) 75 mg EC tablet; Take 1 Tablet by mouth two (2) times daily as needed for Pain. Dispense: 60 Tablet; Refill: 5  - methotrexate (RHEUMATREX) 2.5 mg tablet; Take 6 Tablets by mouth every Wednesday. Dispense: 72 Tablet; Refill: 0    2. Primary osteoarthritis involving multiple joints  - diclofenac EC (VOLTAREN) 75 mg EC tablet;  Take 1 Tablet by mouth two (2) times daily as needed for Pain. Dispense: 60 Tablet; Refill: 5    3. Long-term use of immunosuppressant medication  - Toxicity monitoring labs with infusions  - diclofenac EC (VOLTAREN) 75 mg EC tablet; Take 1 Tablet by mouth two (2) times daily as needed for Pain. Dispense: 60 Tablet; Refill: 5    Patient Instructions   1) Continue your remicade infusions once every 2 months. 2) I am going to drop your methotrexate to 6 pills once every week. Keep taking your folic acid pill daily. 3) Keep updating your labs with every remicade infusion. 4) I will prescribe you diclofenac to take as needed for pain. 5) Follow up in 4 months.        TODAY'S ORDERS    Orders Placed This Encounter    diclofenac EC (VOLTAREN) 75 mg EC tablet    methotrexate (RHEUMATREX) 2.5 mg tablet     Future Appointments   Date Time Provider Michelle Ho   5/16/2022  8:00 AM [facility] INFUSION NURSE [address] [employer]     Face to face time: 20 minutes  Note preparation and records review day of service: 20 minutes  Total provider time day of service: 40 minutes    This was scribed by Parker Rocha in the presence of Dr. Marcelo Blanc MD    Adult Rheumatology   [address]  [facility] [place], [address] [place] [address]   Phone [phone]  Fax [phone]

## 2022-04-18 NOTE — PROGRESS NOTES
Chief Complaint   Patient presents with    Arthritis    Joint Pain     hand     1. Have you been to the ER, urgent care clinic since your last visit? Hospitalized since your last visit? No    2. Have you seen or consulted any other health care providers outside of the [address] [place] [address] since your last visit? Include any pap smears or colon screening.  No

## 2022-05-16 ENCOUNTER — HOSPITAL ENCOUNTER (OUTPATIENT)
Dept: INFUSION THERAPY | Age: 47
Discharge: HOME | End: 2022-05-16
Attending: INTERNAL MEDICINE
Payer: SUBSIDIZED

## 2022-05-16 VITALS
DIASTOLIC BLOOD PRESSURE: 80 MMHG | BODY MASS INDEX: 25.73 KG/M2 | OXYGEN SATURATION: 99 % | RESPIRATION RATE: 18 BRPM | HEART RATE: 49 BPM | WEIGHT: 157 LBS | SYSTOLIC BLOOD PRESSURE: 112 MMHG | TEMPERATURE: 98 F

## 2022-05-16 DIAGNOSIS — M05.79 SEROPOSITIVE RHEUMATOID ARTHRITIS OF MULTIPLE SITES (HCC): Primary | ICD-10-CM

## 2022-05-16 PROCEDURE — 74011000250 HC RX REV CODE- 250: Performed by: INTERNAL MEDICINE

## 2022-05-16 PROCEDURE — 74011250637 HC RX REV CODE- 250/637: Performed by: INTERNAL MEDICINE

## 2022-05-16 PROCEDURE — 74011250636 HC RX REV CODE- 250/636: Performed by: INTERNAL MEDICINE

## 2022-05-16 PROCEDURE — 96365 THER/PROPH/DIAG IV INF INIT: CPT

## 2022-05-16 PROCEDURE — 96366 THER/PROPH/DIAG IV INF ADDON: CPT

## 2022-05-16 RX ORDER — ACETAMINOPHEN 325 MG/1
650 TABLET ORAL ONCE
Status: CANCELLED
Start: 2022-07-11 | End: 2022-07-11

## 2022-05-16 RX ORDER — HEPARIN 100 UNIT/ML
300-500 SYRINGE INTRAVENOUS AS NEEDED
Status: CANCELLED
Start: 2022-07-11

## 2022-05-16 RX ORDER — DIPHENHYDRAMINE HYDROCHLORIDE 50 MG/ML
50 INJECTION, SOLUTION INTRAMUSCULAR; INTRAVENOUS AS NEEDED
Status: CANCELLED
Start: 2022-07-11

## 2022-05-16 RX ORDER — ALBUTEROL SULFATE 0.83 MG/ML
2.5 SOLUTION RESPIRATORY (INHALATION) AS NEEDED
Status: CANCELLED
Start: 2022-07-11

## 2022-05-16 RX ORDER — EPINEPHRINE 1 MG/ML
0.3 INJECTION, SOLUTION, CONCENTRATE INTRAVENOUS AS NEEDED
Status: CANCELLED | OUTPATIENT
Start: 2022-07-11

## 2022-05-16 RX ORDER — SODIUM CHLORIDE 9 MG/ML
25 INJECTION, SOLUTION INTRAVENOUS CONTINUOUS
Status: DISPENSED | OUTPATIENT
Start: 2022-05-16 | End: 2022-05-16

## 2022-05-16 RX ORDER — ONDANSETRON 2 MG/ML
8 INJECTION INTRAMUSCULAR; INTRAVENOUS AS NEEDED
Status: CANCELLED | OUTPATIENT
Start: 2022-07-11

## 2022-05-16 RX ORDER — SODIUM CHLORIDE 9 MG/ML
10 INJECTION INTRAMUSCULAR; INTRAVENOUS; SUBCUTANEOUS AS NEEDED
Status: CANCELLED | OUTPATIENT
Start: 2022-07-11

## 2022-05-16 RX ORDER — SODIUM CHLORIDE 9 MG/ML
25 INJECTION, SOLUTION INTRAVENOUS CONTINUOUS
Status: CANCELLED | OUTPATIENT
Start: 2022-07-11 | End: 2022-07-11

## 2022-05-16 RX ORDER — ACETAMINOPHEN 325 MG/1
650 TABLET ORAL AS NEEDED
Status: CANCELLED
Start: 2022-07-11

## 2022-05-16 RX ORDER — DIPHENHYDRAMINE HYDROCHLORIDE 50 MG/ML
25 INJECTION, SOLUTION INTRAMUSCULAR; INTRAVENOUS AS NEEDED
Status: CANCELLED
Start: 2022-07-11

## 2022-05-16 RX ORDER — SODIUM CHLORIDE 0.9 % (FLUSH) 0.9 %
10 SYRINGE (ML) INJECTION AS NEEDED
Status: CANCELLED | OUTPATIENT
Start: 2022-07-11 | End: 2022-07-11

## 2022-05-16 RX ORDER — HYDROCORTISONE SODIUM SUCCINATE 100 MG/2ML
100 INJECTION, POWDER, FOR SOLUTION INTRAMUSCULAR; INTRAVENOUS AS NEEDED
Status: CANCELLED | OUTPATIENT
Start: 2022-07-11

## 2022-05-16 RX ORDER — ACETAMINOPHEN 325 MG/1
650 TABLET ORAL ONCE
Status: COMPLETED | OUTPATIENT
Start: 2022-05-16 | End: 2022-05-16

## 2022-05-16 RX ORDER — SODIUM CHLORIDE 0.9 % (FLUSH) 0.9 %
10 SYRINGE (ML) INJECTION AS NEEDED
Status: DISPENSED | OUTPATIENT
Start: 2022-05-16 | End: 2022-05-16

## 2022-05-16 RX ADMIN — Medication 10 ML: at 11:34

## 2022-05-16 RX ADMIN — INFLIXIMAB 600 MG: 100 INJECTION, POWDER, LYOPHILIZED, FOR SOLUTION INTRAVENOUS at 09:12

## 2022-05-16 RX ADMIN — Medication 10 ML: at 08:22

## 2022-05-16 RX ADMIN — SODIUM CHLORIDE 25 ML/HR: 9 INJECTION, SOLUTION INTRAVENOUS at 09:12

## 2022-05-16 RX ADMIN — ACETAMINOPHEN 650 MG: 325 TABLET ORAL at 08:24

## 2022-05-16 NOTE — DISCHARGE INSTRUCTIONS
Patient Education   Infliximab-abda (By injection)   Infliximab-abda (in-FLIX-i-mab - abda)  Treats rheumatoid arthritis, psoriatic arthritis, ankylosing spondylitis, Crohn disease, plaque psoriasis, and ulcerative colitis. Brand Name(s): Renflexis   There may be other brand names for this medicine. When This Medicine Should Not Be Used: This medicine is not right for everyone. You should not receive it if you had an allergic reaction to infliximab products or murine (mouse) proteins. How to Use This Medicine:   Injectable  · Your doctor will prescribe your dose and schedule. This medicine is given through a needle placed in a vein. This medicine needs to be given slowly. The needle will remain in place for at least 2 hours. · A nurse or other health provider will give you this medicine. · This medicine should come with a Medication Guide. Ask your pharmacist for a copy if you do not have one. Drugs and Foods to Avoid:   Ask your doctor or pharmacist before using any other medicine, including over-the-counter medicines, vitamins, and herbal products. · Some foods and medicines may affect how infliximab-abda works. Tell your doctor if you are using any of the following:  ¨ Abatacept, anakinra, cyclosporine, etanercept, theophylline, tocilizumab  ¨ Blood thinner (including warfarin)  ¨ Medicine that weakens immune system (including methotrexate or a steroid)  · Tell your doctor if you have had light treatment for psoriasis. · You should not receive a vaccine without your doctor's approval. A live virus vaccine could cause an infection. Children should be current on all vaccines before they start treatment with this medicine. If you received infliximab-abda while pregnant, tell the baby's doctor.   Warnings While Using This Medicine:   · Tell your doctor if you are pregnant or breastfeeding, or if you have heart failure, COPD, liver disease, a bleeding disorder, blood or bone marrow problems, cancer, or heart disease. Tell your doctor if you have a history of seizures, multiple sclerosis, Guillain-Barré syndrome, or a similar nervous system disease. · This medicine may cause the following problems:  ¨ Higher risk of lymphoma or other cancers (including skin cancer)  ¨ Liver damage  ¨ Infusion reaction during or after the infusion, or if you start using the medicine again after not receiving it for a long time  ¨ Lupus-like syndrome  · This medicine may make you bleed, bruise, or get infections more easily. Take precautions to prevent illness and injury. Wash your hands often. · This medicine increases your risk of infection, which could result in serious or life-threatening illness. Tell your doctor if you have a history of frequent or serious infections, including tuberculosis or hepatitis B. Make sure your doctor knows if have an infection or already have trouble with your immune system. This risk may be higher in people who are older than 65 years or who have diabetes. Avoid sick people, and wash your hands often. · Your doctor will check your progress and the effects of this medicine at regular visits. Keep all appointments.   Possible Side Effects While Using This Medicine:   Call your doctor right away if you notice any of these side effects:  · Allergic reaction: Itching or hives, swelling in your face or hands, swelling or tingling in your mouth or throat, chest tightness, trouble breathing  · Dark urine or pale stools, nausea, vomiting, loss of appetite, stomach pain, yellow skin or eyes  · Fever, chills, cough, runny or stuffy nose, sore throat, body aches  · Joint or muscle pain or swelling, trouble swallowing  · Headache, lightheadedness, trouble breathing, rash  · Seizures, numbness, tingling, problems with vision, speech, or walking  · Unusual bleeding, bruising, tiredness, or weakness  · Warm, red, swollen, or painful skin, blisters, skin sores  If you notice these less serious side effects, talk with your doctor:   · Redness, pain, or swelling where the needle is placed  If you notice other side effects that you think are caused by this medicine, tell your doctor. Call your doctor for medical advice about side effects. You may report side effects to FDA at 9-315-WMT-1941  © 2017 [facility] Information is for End User's use only and may not be sold, redistributed or otherwise used for commercial purposes. The above information is an  only. It is not intended as medical advice for individual conditions or treatments. Talk to your doctor, nurse or pharmacist before following any medical regimen to see if it is safe and effective for you.

## 2022-05-16 NOTE — PROGRESS NOTES
[facility] Short Note                       Date: May 16, 2022    Name: Spike Mancia    MRN: [id number]         : 1975    Treatment: inFLIXimab-abda    [facility] COVID-19 SCREENING      The patient was asked the following questions and answered as documented below:    1. Do you have any symptoms of COVID-19? SOB, coughing, fever, or generally not feeling well? NO  2. Have you been exposed to COVID-19 recently? NO  3. Have you had any recent contact with family/friend that has a pending COVID test? NO      Follow Up: Proceed with treatment    Mr. Jackson Haq was assessed and education was provided. Problem: Knowledge Deficit  Goal: *Verbalizes understanding of procedures and medications  Outcome: Progressing Towards Goal     Problem: Patient Education:  Go to Education Activity  Goal: Patient/Family Education  Outcome: Progressing Towards Goal    Lines: 24 gauge IV placed to the RFA  Peripheral IV 22 Right Forearm (Active)   Site Assessment Clean, dry, & intact 22   Phlebitis Assessment 0 22   Infiltration Assessment 0 22   Dressing Status Clean, dry, & intact;New;Occlusive 22   Dressing Type Transparent 22   Hub Color/Line Status Yellow; Flushed;Patent 22        Mr. Anette Matthew vitals were reviewed prior to and after treatment.    Patient Vitals for the past 12 hrs:   Temp Pulse Resp BP SpO2   22 1129 -- (!) 49 -- 112/80 --   22 0813 98 °F (36.7 °C) (!) 55 18 109/72 99 %         Medications given:  Medications Administered     0.9% sodium chloride infusion     Admin Date  2022 Action  New Bag Dose  25 mL/hr Rate  25 mL/hr Route  IntraVENous Administered By  Martínez Shields RN          acetaminophen (TYLENOL) tablet 650 mg     Admin Date  2022 Action  Given Dose  650 mg Route  Oral Administered By  Martínez Shields RN          inFLIXimab-abda (RENFLEXIS) 600 mg in 0.9% sodium chloride 250 mL, overfill volume 25 mL infusion     Admin Date  05/16/2022 Action  New Bag Dose  600 mg Route  IntraVENous Administered By  Radha Lopez RN          sodium chloride (NS) flush 10 mL     Admin Date  05/16/2022 Action  Given Dose  10 mL Route  IntraVENous Administered By  Radha Lopez RN           Admin Date  05/16/2022 Action  Given Dose  10 mL Route  IntraVENous Administered By  Radha Lopez RN                Mr. Kathe Lauren tolerated the treatment without complaints. IV flushed and removed. Mr. Kathe Lauren was discharged from [place] [address] in stable condition at 1135.       Patient provided with AVS , which includes future appointment and written educational material.     Future Appointments   Date Time Provider Michelle Ho   7/11/2022  8:00  [address]       Beuford Libman, RN  May 16, 2022  9:23 AM

## 2022-07-05 DIAGNOSIS — M05.79 SEROPOSITIVE RHEUMATOID ARTHRITIS OF MULTIPLE SITES (HCC): Primary | ICD-10-CM

## 2022-07-05 DIAGNOSIS — Z79.60 LONG-TERM USE OF IMMUNOSUPPRESSANT MEDICATION: ICD-10-CM

## 2022-07-06 ENCOUNTER — OFFICE VISIT (OUTPATIENT)
Dept: RHEUMATOLOGY | Age: 47
End: 2022-07-06

## 2022-07-06 VITALS
DIASTOLIC BLOOD PRESSURE: 62 MMHG | HEART RATE: 49 BPM | BODY MASS INDEX: 26.49 KG/M2 | WEIGHT: 159 LBS | HEIGHT: 65 IN | RESPIRATION RATE: 18 BRPM | SYSTOLIC BLOOD PRESSURE: 100 MMHG | TEMPERATURE: 98 F

## 2022-07-06 DIAGNOSIS — M05.79 SEROPOSITIVE RHEUMATOID ARTHRITIS OF MULTIPLE SITES (HCC): Primary | ICD-10-CM

## 2022-07-06 DIAGNOSIS — R76.8 ANA POSITIVE: ICD-10-CM

## 2022-07-06 DIAGNOSIS — Z79.60 LONG-TERM USE OF IMMUNOSUPPRESSANT MEDICATION: ICD-10-CM

## 2022-07-06 PROCEDURE — 99215 OFFICE O/P EST HI 40 MIN: CPT | Performed by: INTERNAL MEDICINE

## 2022-07-06 NOTE — PROGRESS NOTES
REASON FOR VISIT    This is a follow-up visit for Mr. Jay Brody for     ICD-10-CM   1. Seropositive rheumatoid arthritis of multiple sites ([facility]  M05.79     Inflammatory arthritis phenotype includes:  Anti-CCP positive: yes (186)  Rheumatoid factor positive: yes (>600)  Erosive disease: N/A  Extra-articular manifestations include: none    Immunosuppression Screening (3/19/2021): Quantiferon TB: negative  PPD:  Not performed  Hepatitis B: negative  Hepatitis C: negative    Therapy History includes:  Current DMARD therapy includes: methotrexate 20 mg every Wednesday (3/04/2021 to present), infliximab 5 mg/kg every 8 weeks (6/21/2021-1/2022), 10mg/kg every 8 weeks (1/22- present)  Prior DMARD therapy includes:  hydroxychloroquine 400 mg daily (2/26/2021 to 8/31/2021), methotrexate 12.5 mg every Wednesday (2/26/2021 to 3/04/2021)  The following DMARDs have been ineffective: none  The following DMARDs were stopped because of side effects: none    HISTORY OF PRESENT ILLNESS    Mr. Jay Brody returns for a follow-up. Since last visit 4/18/2022 he has been taking methotrexate 15mg weekly    Overall feels OK. Does feel he can feel the difference between the 6 pills and 8 pills methotrexate weekly. Sun sensitive pruritic nodular rash, shows some excoriations. Had been on Plaquenil last year, stopped for unclear reasons, presumably with his symptomatic improvement on     Now 10-20min morning stiffness. 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 Takes more breaks during the day due to fatigue or particularly finger pain when using a hammer. REVIEW OF SYSTEMS    A comprehensive review of systems was performed and pertinent results are documented in the HPI, review of systems is otherwise non-contributory. PAST MEDICAL HISTORY    He has a past medical history of Rheumatoid arteritis ([facility] (01/2021). FAMILY HISTORY    His family history includes Osteoporosis in his mother.     SOCIAL HISTORY    He reports that he quit smoking about 15 months ago. He has never used smokeless tobacco. He reports current alcohol use of about 2.0 standard drinks of alcohol per week. He reports previous drug use. MEDICATIONS    Current Outpatient Medications   Medication Sig    diclofenac EC (VOLTAREN) 75 mg EC tablet Take 1 Tablet by mouth two (2) times daily as needed for Pain.  methotrexate (RHEUMATREX) 2.5 mg tablet Take 6 Tablets by mouth every Wednesday.  folic acid (FOLVITE) 1 mg tablet Take 1 Tablet by mouth daily.  INFLIXIMAB IV 10 mg/kg by IntraVENous route every two (2) months. No current facility-administered medications for this visit. ALLERGIES    No Known Allergies    PHYSICAL EXAMINATION    Visit Vitals  /62   Pulse (!) 49   Temp 98 °F (36.7 °C)   Resp 18   Ht 5' 5\" (1.651 m)   Wt 159 lb (72.1 kg)   BMI 26.46 kg/m²     Body mass index is 26.06 kg/m². General:  The patient is fit-appearing, alert, and in no apparent distress. Eyes: Sclera are anicteric. No conjunctival injection. HEENT:  Oropharynx is clear. No oral ulcers. Adequate salivary pooling. No cervical or supraclavicular lymphadenopathy. Lungs:  Clear to auscultation bilaterally, without wheeze or stridor. Normal respiratory effort. Cor:  Regular rate and rhythm. No murmur rub or gallop. Abdomen: Soft, non-tender, without hepatomegaly or masses. Extremities: No calf tenderness or edema. Warm and well perfused. Skin:  No significant abnormalities. Neuro: Nonfocal  Musculoskeletal:    A comprehensive musculoskeletal exam was performed for all joints of each upper and lower extremity and assessed for swelling, tenderness and range of motion. Results are documented as below: Stable early heberden and kunal nodes in both first 2 fingers. L 5th finger fixed flexion deformity. No tenderness in hands. Chronic soft tissue prominence of L ankle without warmth or tenderness.    No evidence of synovitis in the small joints of the hands, wrists, shoulders, elbows, hips, knees or ankles. DATA REVIEW    Laboratory     3/21/22: CRP <0.29, ESR 49, Cr 0.95, AST 11, alk phos 66, ALT 44, Tbilli 1.1, WBC 4.2, HGB 14.6, Plt 249,   Recent laboratory results were reviewed, summarized, and discussed with the patient. Imaging    Musculoskeletal Ultrasound    None    Radiographs    None    CT Imaging    None    MR Imaging    None    DXA     None    ASSESSMENT AND PLAN    48yo with RF+,CCP+ rheumatoid arthritis with low disease activity on methotrexate 15mg weekly and infliximab 10mg/kg every 8 weeks. 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 Reviewed sun avoidance, SPF70+ sunblock. Previously direct NILE+ but antibody specificities not reported, rechecking. He prefers to hold off on resuming Plaquenil (hydroxychloroquine). 1. NILE positive  - NILE COMPREHENSIVE PLUS PANEL; Future  - SED RATE (ESR); Future  - C REACTIVE PROTEIN, QT; Future  - COMPLEMENT, C3 & C4; Future  - METABOLIC PANEL, COMPREHENSIVE; Future  - CBC WITH AUTOMATED DIFF; Future  - URINALYSIS W/ REFLEX CULTURE; Future  - PROT+CREATU (RANDOM); Future    2. Seropositive rheumatoid arthritis of multiple sites (HCC)  -Cont methotrexate 15mg weekly, infliximab 10mg/kg every 8 weeks  - NILE COMPREHENSIVE PLUS PANEL; Future  - SED RATE (ESR); Future  - C REACTIVE PROTEIN, QT; Future  - COMPLEMENT, C3 & C4; Future  - METABOLIC PANEL, COMPREHENSIVE; Future  - CBC WITH AUTOMATED DIFF; Future  - URINALYSIS W/ REFLEX CULTURE; Future  - PROT+CREATU (RANDOM); Future    3. Long-term use of immunosuppressant medication  - CBC WITH AUTOMATED DIFF; Future    Patient Instructions   1. Continue methotrexate 6 pills once a week, and daily folic acid.     2. We're checking more labs with your next infusion on 7/11--if these are abnormal or if your skin rash worsens, we can have another discussion about starting Plaquenil (hydroxychloroquine) and referring you to an eye doctor for monitoring. 3. We'll continue to monitor labs with your infusions. 4. Return in 4-5 months.       TODAY'S ORDERS    Orders Placed This Encounter    NILE COMPREHENSIVE PLUS PANEL    SED RATE (ESR)    C REACTIVE PROTEIN, QT    COMPLEMENT, C3 & C4    METABOLIC PANEL, COMPREHENSIVE    CBC WITH AUTOMATED DIFF    URINALYSIS W/ REFLEX CULTURE    PROT+CREATU (RANDOM)     Future Appointments   Date Time Provider Michelle Ho   7/11/2022  8:00 AM [facility]ION NURSE 1  [facility]     Face to face time: 20 minutes  Note preparation and records review day of service: 22 minutes  Total provider time day of service: 42 minutes    Guadalupe Sheffield MD    Adult Rheumatology   [facility]  A Part of [facility], [address] [place] [address]   Phone [phone]  Fax [phone]

## 2022-07-06 NOTE — PATIENT INSTRUCTIONS
1. Continue methotrexate 6 pills once a week, and daily folic acid. 2. We're checking more labs with your next infusion on 7/11--if these are abnormal or if your skin rash worsens, we can have another discussion about starting Plaquenil (hydroxychloroquine) and referring you to an eye doctor for monitoring. 3. We'll continue to monitor labs with your infusions. 4. Return in 4-5 months.

## 2022-07-11 ENCOUNTER — HOSPITAL ENCOUNTER (OUTPATIENT)
Dept: INFUSION THERAPY | Age: 47
Discharge: HOME | End: 2022-07-11
Attending: INTERNAL MEDICINE

## 2022-07-11 VITALS
HEART RATE: 46 BPM | RESPIRATION RATE: 18 BRPM | BODY MASS INDEX: 26.96 KG/M2 | WEIGHT: 162 LBS | OXYGEN SATURATION: 99 % | SYSTOLIC BLOOD PRESSURE: 110 MMHG | DIASTOLIC BLOOD PRESSURE: 71 MMHG | TEMPERATURE: 98.6 F

## 2022-07-11 DIAGNOSIS — M05.79 SEROPOSITIVE RHEUMATOID ARTHRITIS OF MULTIPLE SITES (HCC): Primary | ICD-10-CM

## 2022-07-11 LAB
ALBUMIN SERPL-MCNC: 3.5 G/DL (ref 3.5–5)
ALBUMIN/GLOB SERPL: 0.9 {RATIO} (ref 1.1–2.2)
ALP SERPL-CCNC: 66 U/L (ref 45–117)
ALT SERPL-CCNC: 38 U/L (ref 12–78)
ANION GAP SERPL CALC-SCNC: 11 MMOL/L (ref 5–15)
APPEARANCE UR: CLEAR
AST SERPL-CCNC: 7 U/L (ref 15–37)
BACTERIA URNS QL MICRO: NEGATIVE /HPF
BASOPHILS # BLD: 0 K/UL (ref 0–0.1)
BASOPHILS NFR BLD: 0 % (ref 0–1)
BILIRUB SERPL-MCNC: 0.8 MG/DL (ref 0.2–1)
BILIRUB UR QL: NEGATIVE
BUN SERPL-MCNC: 14 MG/DL (ref 6–20)
BUN/CREAT SERPL: 14 (ref 12–20)
CALCIUM SERPL-MCNC: 8.3 MG/DL (ref 8.5–10.1)
CHLORIDE SERPL-SCNC: 103 MMOL/L (ref 97–108)
CO2 SERPL-SCNC: 23 MMOL/L (ref 21–32)
COLOR UR: NORMAL
CREAT SERPL-MCNC: 0.97 MG/DL (ref 0.7–1.3)
CRP SERPL-MCNC: <0.29 MG/DL (ref 0–0.6)
DIFFERENTIAL METHOD BLD: NORMAL
EOSINOPHIL # BLD: 0 K/UL (ref 0–0.4)
EOSINOPHIL NFR BLD: 1 % (ref 0–7)
EPITH CASTS URNS QL MICRO: NORMAL /LPF
ERYTHROCYTE [DISTWIDTH] IN BLOOD BY AUTOMATED COUNT: 12.2 % (ref 11.5–14.5)
ERYTHROCYTE [SEDIMENTATION RATE] IN BLOOD: 24 MM/HR (ref 0–15)
GLOBULIN SER CALC-MCNC: 4 G/DL (ref 2–4)
GLUCOSE SERPL-MCNC: 160 MG/DL (ref 65–100)
GLUCOSE UR STRIP.AUTO-MCNC: NEGATIVE MG/DL
HCT VFR BLD AUTO: 42.5 % (ref 36.6–50.3)
HGB BLD-MCNC: 14.9 G/DL (ref 12.1–17)
HGB UR QL STRIP: NEGATIVE
IMM GRANULOCYTES # BLD AUTO: 0 K/UL (ref 0–0.04)
IMM GRANULOCYTES NFR BLD AUTO: 0 % (ref 0–0.5)
KETONES UR QL STRIP.AUTO: NEGATIVE MG/DL
LEUKOCYTE ESTERASE UR QL STRIP.AUTO: NEGATIVE
LYMPHOCYTES # BLD: 1.5 K/UL (ref 0.8–3.5)
LYMPHOCYTES NFR BLD: 33 % (ref 12–49)
MCH RBC QN AUTO: 30.3 PG (ref 26–34)
MCHC RBC AUTO-ENTMCNC: 35.1 G/DL (ref 30–36.5)
MCV RBC AUTO: 86.6 FL (ref 80–99)
MONOCYTES # BLD: 0.5 K/UL (ref 0–1)
MONOCYTES NFR BLD: 11 % (ref 5–13)
NEUTS SEG # BLD: 2.5 K/UL (ref 1.8–8)
NEUTS SEG NFR BLD: 55 % (ref 32–75)
NITRITE UR QL STRIP.AUTO: NEGATIVE
NRBC # BLD: 0 K/UL (ref 0–0.01)
NRBC BLD-RTO: 0 PER 100 WBC
PH UR STRIP: 5.5 [PH] (ref 5–8)
PLATELET # BLD AUTO: 138 K/UL (ref 150–400)
PLATELET # BLD AUTO: NORMAL K/UL (ref 150–400)
POTASSIUM SERPL-SCNC: 3.7 MMOL/L (ref 3.5–5.1)
PROT SERPL-MCNC: 7.5 G/DL (ref 6.4–8.2)
PROT UR STRIP-MCNC: NEGATIVE MG/DL
PROT UR-MCNC: 18 MG/DL (ref 0–11.9)
RBC # BLD AUTO: 4.91 M/UL (ref 4.1–5.7)
RBC #/AREA URNS HPF: NORMAL /HPF (ref 0–5)
RBC MORPH BLD: NORMAL
SODIUM SERPL-SCNC: 137 MMOL/L (ref 136–145)
SP GR UR REFRACTOMETRY: 1.02
UA: UC IF INDICATED,UAUC: NORMAL
UROBILINOGEN UR QL STRIP.AUTO: 1 EU/DL (ref 0.2–1)
WBC # BLD AUTO: 4.5 K/UL (ref 4.1–11.1)
WBC URNS QL MICRO: NORMAL /HPF (ref 0–4)

## 2022-07-11 PROCEDURE — 96415 CHEMO IV INFUSION ADDL HR: CPT

## 2022-07-11 PROCEDURE — 85025 COMPLETE CBC W/AUTO DIFF WBC: CPT

## 2022-07-11 PROCEDURE — 36415 COLL VENOUS BLD VENIPUNCTURE: CPT

## 2022-07-11 PROCEDURE — 74011250637 HC RX REV CODE- 250/637: Performed by: INTERNAL MEDICINE

## 2022-07-11 PROCEDURE — 85652 RBC SED RATE AUTOMATED: CPT

## 2022-07-11 PROCEDURE — 84156 ASSAY OF PROTEIN URINE: CPT

## 2022-07-11 PROCEDURE — 86225 DNA ANTIBODY NATIVE: CPT

## 2022-07-11 PROCEDURE — 81001 URINALYSIS AUTO W/SCOPE: CPT

## 2022-07-11 PROCEDURE — 74011250636 HC RX REV CODE- 250/636: Performed by: INTERNAL MEDICINE

## 2022-07-11 PROCEDURE — 86160 COMPLEMENT ANTIGEN: CPT

## 2022-07-11 PROCEDURE — 80053 COMPREHEN METABOLIC PANEL: CPT

## 2022-07-11 PROCEDURE — 74011000250 HC RX REV CODE- 250: Performed by: INTERNAL MEDICINE

## 2022-07-11 PROCEDURE — 86140 C-REACTIVE PROTEIN: CPT

## 2022-07-11 PROCEDURE — 85049 AUTOMATED PLATELET COUNT: CPT

## 2022-07-11 PROCEDURE — 96413 CHEMO IV INFUSION 1 HR: CPT

## 2022-07-11 RX ORDER — DIPHENHYDRAMINE HYDROCHLORIDE 50 MG/ML
25 INJECTION, SOLUTION INTRAMUSCULAR; INTRAVENOUS AS NEEDED
Status: CANCELLED
Start: 2022-09-04

## 2022-07-11 RX ORDER — ACETAMINOPHEN 325 MG/1
650 TABLET ORAL ONCE
Status: CANCELLED
Start: 2022-09-04 | End: 2022-09-04

## 2022-07-11 RX ORDER — SODIUM CHLORIDE 9 MG/ML
25 INJECTION, SOLUTION INTRAVENOUS CONTINUOUS
Status: DISPENSED | OUTPATIENT
Start: 2022-07-11 | End: 2022-07-11

## 2022-07-11 RX ORDER — DIPHENHYDRAMINE HYDROCHLORIDE 50 MG/ML
50 INJECTION, SOLUTION INTRAMUSCULAR; INTRAVENOUS AS NEEDED
Status: CANCELLED
Start: 2022-09-04

## 2022-07-11 RX ORDER — ONDANSETRON 2 MG/ML
8 INJECTION INTRAMUSCULAR; INTRAVENOUS AS NEEDED
Status: CANCELLED | OUTPATIENT
Start: 2022-09-04

## 2022-07-11 RX ORDER — HEPARIN 100 UNIT/ML
300-500 SYRINGE INTRAVENOUS AS NEEDED
Status: CANCELLED
Start: 2022-09-04

## 2022-07-11 RX ORDER — ACETAMINOPHEN 325 MG/1
650 TABLET ORAL AS NEEDED
Status: CANCELLED
Start: 2022-09-04

## 2022-07-11 RX ORDER — ALBUTEROL SULFATE 0.83 MG/ML
2.5 SOLUTION RESPIRATORY (INHALATION) AS NEEDED
Status: CANCELLED
Start: 2022-09-04

## 2022-07-11 RX ORDER — SODIUM CHLORIDE 0.9 % (FLUSH) 0.9 %
10 SYRINGE (ML) INJECTION AS NEEDED
Status: DISPENSED | OUTPATIENT
Start: 2022-07-11 | End: 2022-07-11

## 2022-07-11 RX ORDER — EPINEPHRINE 1 MG/ML
0.3 INJECTION, SOLUTION, CONCENTRATE INTRAVENOUS AS NEEDED
Status: CANCELLED | OUTPATIENT
Start: 2022-09-04

## 2022-07-11 RX ORDER — ACETAMINOPHEN 325 MG/1
650 TABLET ORAL ONCE
Status: COMPLETED | OUTPATIENT
Start: 2022-07-11 | End: 2022-07-11

## 2022-07-11 RX ORDER — SODIUM CHLORIDE 9 MG/ML
10 INJECTION INTRAMUSCULAR; INTRAVENOUS; SUBCUTANEOUS AS NEEDED
Status: CANCELLED | OUTPATIENT
Start: 2022-09-04

## 2022-07-11 RX ORDER — SODIUM CHLORIDE 9 MG/ML
25 INJECTION, SOLUTION INTRAVENOUS CONTINUOUS
Status: CANCELLED | OUTPATIENT
Start: 2022-09-04

## 2022-07-11 RX ORDER — SODIUM CHLORIDE 0.9 % (FLUSH) 0.9 %
10 SYRINGE (ML) INJECTION AS NEEDED
Status: CANCELLED | OUTPATIENT
Start: 2022-09-04

## 2022-07-11 RX ORDER — HYDROCORTISONE SODIUM SUCCINATE 100 MG/2ML
100 INJECTION, POWDER, FOR SOLUTION INTRAMUSCULAR; INTRAVENOUS AS NEEDED
Status: CANCELLED | OUTPATIENT
Start: 2022-09-04

## 2022-07-11 RX ADMIN — INFLIXIMAB 700 MG: 100 INJECTION, POWDER, LYOPHILIZED, FOR SOLUTION INTRAVENOUS at 09:08

## 2022-07-11 RX ADMIN — SODIUM CHLORIDE 25 ML/HR: 9 INJECTION, SOLUTION INTRAVENOUS at 09:08

## 2022-07-11 RX ADMIN — ACETAMINOPHEN 650 MG: 325 TABLET ORAL at 08:20

## 2022-07-11 RX ADMIN — Medication 10 ML: at 08:19

## 2022-07-11 NOTE — DISCHARGE INSTRUCTIONS
Patient Education   Infliximab-abda (By injection)   Infliximab-abda (in-FLIX-i-mab - abda)  Treats rheumatoid arthritis, psoriatic arthritis, ankylosing spondylitis, Crohn disease, plaque psoriasis, and ulcerative colitis. Brand Name(s): Renflexis   There may be other brand names for this medicine. When This Medicine Should Not Be Used: This medicine is not right for everyone. You should not receive it if you had an allergic reaction to infliximab products or murine (mouse) proteins. How to Use This Medicine:   Injectable  · Your doctor will prescribe your dose and schedule. This medicine is given through a needle placed in a vein. This medicine needs to be given slowly. The needle will remain in place for at least 2 hours. · A nurse or other health provider will give you this medicine. · This medicine should come with a Medication Guide. Ask your pharmacist for a copy if you do not have one. Drugs and Foods to Avoid:   Ask your doctor or pharmacist before using any other medicine, including over-the-counter medicines, vitamins, and herbal products. · Some foods and medicines may affect how infliximab-abda works. Tell your doctor if you are using any of the following:  ¨ Abatacept, anakinra, cyclosporine, etanercept, theophylline, tocilizumab  ¨ Blood thinner (including warfarin)  ¨ Medicine that weakens immune system (including methotrexate or a steroid)  · Tell your doctor if you have had light treatment for psoriasis. · You should not receive a vaccine without your doctor's approval. A live virus vaccine could cause an infection. Children should be current on all vaccines before they start treatment with this medicine. If you received infliximab-abda while pregnant, tell the baby's doctor.   Warnings While Using This Medicine:   · Tell your doctor if you are pregnant or breastfeeding, or if you have heart failure, COPD, liver disease, a bleeding disorder, blood or bone marrow problems, cancer, or heart disease. Tell your doctor if you have a history of seizures, multiple sclerosis, Guillain-Barré syndrome, or a similar nervous system disease. · This medicine may cause the following problems:  ¨ Higher risk of lymphoma or other cancers (including skin cancer)  ¨ Liver damage  ¨ Infusion reaction during or after the infusion, or if you start using the medicine again after not receiving it for a long time  ¨ Lupus-like syndrome  · This medicine may make you bleed, bruise, or get infections more easily. Take precautions to prevent illness and injury. Wash your hands often. · This medicine increases your risk of infection, which could result in serious or life-threatening illness. Tell your doctor if you have a history of frequent or serious infections, including tuberculosis or hepatitis B. Make sure your doctor knows if have an infection or already have trouble with your immune system. This risk may be higher in people who are older than 65 years or who have diabetes. Avoid sick people, and wash your hands often. · Your doctor will check your progress and the effects of this medicine at regular visits. Keep all appointments.   Possible Side Effects While Using This Medicine:   Call your doctor right away if you notice any of these side effects:  · Allergic reaction: Itching or hives, swelling in your face or hands, swelling or tingling in your mouth or throat, chest tightness, trouble breathing  · Dark urine or pale stools, nausea, vomiting, loss of appetite, stomach pain, yellow skin or eyes  · Fever, chills, cough, runny or stuffy nose, sore throat, body aches  · Joint or muscle pain or swelling, trouble swallowing  · Headache, lightheadedness, trouble breathing, rash  · Seizures, numbness, tingling, problems with vision, speech, or walking  · Unusual bleeding, bruising, tiredness, or weakness  · Warm, red, swollen, or painful skin, blisters, skin sores  If you notice these less serious side effects, talk with your doctor:   · Redness, pain, or swelling where the needle is placed  If you notice other side effects that you think are caused by this medicine, tell your doctor. Call your doctor for medical advice about side effects. You may report side effects to FDA at 2-086-AIT-3322  © 2017 [facility] Information is for End User's use only and may not be sold, redistributed or otherwise used for commercial purposes. The above information is an  only. It is not intended as medical advice for individual conditions or treatments. Talk to your doctor, nurse or pharmacist before following any medical regimen to see if it is safe and effective for you.

## 2022-07-11 NOTE — PROGRESS NOTES
OPIC Short Note                       Date: 2022    Name: Radha Miller    MRN: [id number]         : 1975    Treatment: Infliximab-abda    OPIC COVID-19 SCREENING      The patient was asked the following questions and answered as documented below:    1. Do you have any symptoms of COVID-19? SOB, coughing, fever, or generally not feeling well? NO  2. Have you been exposed to COVID-19 recently? NO  3. Have you had any recent contact with family/friend that has a pending COVID test? NO      Follow Up: Proceed with treatment    Mr. Yarelis Dallas was assessed and education was provided. Problem: Knowledge Deficit  Goal: *Verbalizes understanding of procedures and medications  Outcome: Progressing Towards Goal     Problem: Patient Education:  Go to Education Activity  Goal: Patient/Family Education  Outcome: Progressing Towards Goal    Lines: 24 gauge IV placed to the RFA, labs drawn and processed. Peripheral IV 22 Right Forearm (Active)   Site Assessment Clean, dry, & intact 22   Phlebitis Assessment 0 22   Infiltration Assessment 0 22   Dressing Status Clean, dry, & intact;New;Occlusive 22   Dressing Type Transparent 22   Hub Color/Line Status Yellow; Flushed;Patent 22   Action Taken Blood drawn 22   Alcohol Cap Used Yes 22        Mr. Anette Matthew vitals were reviewed prior to and after treatment. Patient Vitals for the past 12 hrs:   Temp Pulse Resp BP SpO2   22 1126 -- (!) 46 -- 110/71 --   22 0801 98.6 °F (37 °C) (!) 53 18 102/69 99 %         Lab results were obtained and reviewed.   Recent Results (from the past 12 hour(s))   SED RATE (ESR)    Collection Time: 22  8:10 AM   Result Value Ref Range    Sed rate, automated 24 (H) 0 - 15 mm/hr   METABOLIC PANEL, COMPREHENSIVE    Collection Time: 22  8:10 AM   Result Value Ref Range    Sodium 137 136 - 145 mmol/L    Potassium 3.7 3.5 - 5.1 mmol/L    Chloride 103 97 - 108 mmol/L    CO2 23 21 - 32 mmol/L    Anion gap 11 5 - 15 mmol/L    Glucose 160 (H) 65 - 100 mg/dL    BUN 14 6 - 20 MG/DL    Creatinine 0.97 0.70 - 1.30 MG/DL    BUN/Creatinine ratio 14 12 - 20      GFR est AA >60 >60 ml/min/1.73m2    GFR est non-AA >60 >60 ml/min/1.73m2    Calcium 8.3 (L) 8.5 - 10.1 MG/DL    Bilirubin, total 0.8 0.2 - 1.0 MG/DL    ALT (SGPT) 38 12 - 78 U/L    AST (SGOT) 7 (L) 15 - 37 U/L    Alk. phosphatase 66 45 - 117 U/L    Protein, total 7.5 6.4 - 8.2 g/dL    Albumin 3.5 3.5 - 5.0 g/dL    Globulin 4.0 2.0 - 4.0 g/dL    A-G Ratio 0.9 (L) 1.1 - 2.2     CBC WITH AUTOMATED DIFF    Collection Time: 07/11/22  8:10 AM   Result Value Ref Range    WBC 4.5 4.1 - 11.1 K/uL    RBC 4.91 4.10 - 5.70 M/uL    HGB 14.9 12.1 - 17.0 g/dL    HCT 42.5 36.6 - 50.3 %    MCV 86.6 80.0 - 99.0 FL    MCH 30.3 26.0 - 34.0 PG    MCHC 35.1 30.0 - 36.5 g/dL    RDW 12.2 11.5 - 14.5 %    PLATELET  694 - 053 K/uL     UNABLE TO REPORT ACCURATE COUNT DUE TO PLATELET AGGREGATION, HOWEVER, PLATELETS APPEAR NORMAL IN NUMBER ON SMEAR. PLEASE RESUBMIT SODIUM CITRATE (BLUE) AND EDTA (LAVENDER) TUBES FOR HEMATOLOGICAL TESTING. NRBC 0.0 0  WBC    ABSOLUTE NRBC 0.00 0.00 - 0.01 K/uL    NEUTROPHILS 55 32 - 75 %    LYMPHOCYTES 33 12 - 49 %    MONOCYTES 11 5 - 13 %    EOSINOPHILS 1 0 - 7 %    BASOPHILS 0 0 - 1 %    IMMATURE GRANULOCYTES 0 0.0 - 0.5 %    ABS. NEUTROPHILS 2.5 1.8 - 8.0 K/UL    ABS. LYMPHOCYTES 1.5 0.8 - 3.5 K/UL    ABS. MONOCYTES 0.5 0.0 - 1.0 K/UL    ABS. EOSINOPHILS 0.0 0.0 - 0.4 K/UL    ABS. BASOPHILS 0.0 0.0 - 0.1 K/UL    ABS. IMM.  GRANS. 0.0 0.00 - 0.04 K/UL    DF SMEAR SCANNED      RBC COMMENTS ANISOCYTOSIS  1+       URINALYSIS W/ REFLEX CULTURE    Collection Time: 07/11/22  8:10 AM    Specimen: Urine   Result Value Ref Range    Color YELLOW/STRAW      Appearance CLEAR CLEAR      Specific gravity 1.020      pH (UA) 5.5 5.0 - 8.0      Protein Negative NEG mg/dL    Glucose Negative NEG mg/dL    Ketone Negative NEG mg/dL    Bilirubin Negative NEG      Blood Negative NEG      Urobilinogen 1.0 0.2 - 1.0 EU/dL    Nitrites Negative NEG      Leukocyte Esterase Negative NEG      WBC 0-4 0 - 4 /hpf    RBC 0-5 0 - 5 /hpf    Epithelial cells FEW FEW /lpf    Bacteria Negative NEG /hpf    UA:UC IF INDICATED CULTURE NOT INDICATED BY UA RESULT CNI     PLATELET COUNT    Collection Time: 07/11/22  9:34 AM   Result Value Ref Range    PLATELET 807 (L) 811 - 400 K/uL       Medications given:  Medications Administered     0.9% sodium chloride infusion     Admin Date  07/11/2022 Action  New Bag Dose  25 mL/hr Rate  25 mL/hr Route  IntraVENous Administered By  Leona Rodriguez RN          acetaminophen (TYLENOL) tablet 650 mg     Admin Date  07/11/2022 Action  Given Dose  650 mg Route  Oral Administered By  Leona Rodriguez RN          inFLIXimab-abda (RENFLEXIS) 700 mg in 0.9% sodium chloride 250 mL, overfill volume 25 mL infusion     Admin Date  07/11/2022 Action  New Bag Dose  700 mg Route  IntraVENous Administered By  Leona Rodriguez RN          sodium chloride (NS) flush 10 mL     Admin Date  07/11/2022 Action  Given Dose  10 mL Route  IntraVENous Administered By  Leona Rodriguez RN                Mr. Mariusz Castro tolerated the treatment without complaints. IV flushed and removed. Mr. Mariusz Castro was discharged from [place] [address] in stable condition at 1140.       Patient provided with AVS , which includes future appointment and written educational material.     Future Appointments   Date Time Provider Michelle Ho   9/6/2022  8:00  [address]       Emily Regalado RN  July 11, 2022  11:38 AM

## 2022-07-12 LAB
C3 SERPL-MCNC: 75 MG/DL (ref 82–167)
C4 SERPL-MCNC: 13 MG/DL (ref 12–38)
CENTROMERE B AB SER-ACNC: <0.2 AI (ref 0–0.9)
CHROMATIN AB SERPL-ACNC: 0.8 AI (ref 0–0.9)
DSDNA AB SER-ACNC: 23 IU/ML (ref 0–9)
ENA JO1 AB SER-ACNC: <0.2 AI (ref 0–0.9)
ENA RNP AB SER-ACNC: 0.6 AI (ref 0–0.9)
ENA SCL70 AB SER-ACNC: <0.2 AI (ref 0–0.9)
ENA SM AB SER-ACNC: <0.2 AI (ref 0–0.9)
ENA SM+RNP AB SER-ACNC: <0.2 AI (ref 0–0.9)
ENA SS-A AB SER-ACNC: 0.3 AI (ref 0–0.9)
ENA SS-B AB SER-ACNC: <0.2 AI (ref 0–0.9)
RIBOSOMAL P AB SER-ACNC: <0.2 AI (ref 0–0.9)
SEE BELOW:, 164879: ABNORMAL

## 2022-09-06 ENCOUNTER — HOSPITAL ENCOUNTER (OUTPATIENT)
Dept: INFUSION THERAPY | Age: 47
Discharge: HOME | End: 2022-09-06
Attending: INTERNAL MEDICINE

## 2022-09-06 VITALS
RESPIRATION RATE: 16 BRPM | DIASTOLIC BLOOD PRESSURE: 68 MMHG | TEMPERATURE: 97.1 F | OXYGEN SATURATION: 100 % | SYSTOLIC BLOOD PRESSURE: 115 MMHG | WEIGHT: 158 LBS | HEART RATE: 60 BPM | BODY MASS INDEX: 26.33 KG/M2 | HEIGHT: 65 IN

## 2022-09-06 DIAGNOSIS — M05.79 SEROPOSITIVE RHEUMATOID ARTHRITIS OF MULTIPLE SITES (HCC): Primary | ICD-10-CM

## 2022-09-06 PROCEDURE — 74011250637 HC RX REV CODE- 250/637: Performed by: INTERNAL MEDICINE

## 2022-09-06 PROCEDURE — 96365 THER/PROPH/DIAG IV INF INIT: CPT

## 2022-09-06 PROCEDURE — 96366 THER/PROPH/DIAG IV INF ADDON: CPT

## 2022-09-06 PROCEDURE — 74011000250 HC RX REV CODE- 250: Performed by: INTERNAL MEDICINE

## 2022-09-06 PROCEDURE — 74011250636 HC RX REV CODE- 250/636: Performed by: INTERNAL MEDICINE

## 2022-09-06 RX ORDER — DIPHENHYDRAMINE HYDROCHLORIDE 50 MG/ML
25 INJECTION, SOLUTION INTRAMUSCULAR; INTRAVENOUS AS NEEDED
Status: CANCELLED
Start: 2022-11-01

## 2022-09-06 RX ORDER — ACETAMINOPHEN 325 MG/1
650 TABLET ORAL AS NEEDED
Status: CANCELLED
Start: 2022-11-01

## 2022-09-06 RX ORDER — SODIUM CHLORIDE 9 MG/ML
10 INJECTION INTRAMUSCULAR; INTRAVENOUS; SUBCUTANEOUS AS NEEDED
Status: CANCELLED | OUTPATIENT
Start: 2022-11-01

## 2022-09-06 RX ORDER — EPINEPHRINE 1 MG/ML
0.3 INJECTION, SOLUTION, CONCENTRATE INTRAVENOUS AS NEEDED
Status: CANCELLED | OUTPATIENT
Start: 2022-11-01

## 2022-09-06 RX ORDER — ACETAMINOPHEN 325 MG/1
650 TABLET ORAL ONCE
Status: CANCELLED
Start: 2022-11-01 | End: 2022-11-01

## 2022-09-06 RX ORDER — ACETAMINOPHEN 325 MG/1
650 TABLET ORAL ONCE
Status: COMPLETED | OUTPATIENT
Start: 2022-09-06 | End: 2022-09-06

## 2022-09-06 RX ORDER — ALBUTEROL SULFATE 0.83 MG/ML
2.5 SOLUTION RESPIRATORY (INHALATION) AS NEEDED
Status: CANCELLED
Start: 2022-11-01

## 2022-09-06 RX ORDER — ONDANSETRON 2 MG/ML
8 INJECTION INTRAMUSCULAR; INTRAVENOUS AS NEEDED
Status: CANCELLED | OUTPATIENT
Start: 2022-11-01

## 2022-09-06 RX ORDER — DIPHENHYDRAMINE HYDROCHLORIDE 50 MG/ML
50 INJECTION, SOLUTION INTRAMUSCULAR; INTRAVENOUS AS NEEDED
Status: CANCELLED
Start: 2022-11-01

## 2022-09-06 RX ORDER — SODIUM CHLORIDE 9 MG/ML
25 INJECTION, SOLUTION INTRAVENOUS CONTINUOUS
Status: DISPENSED | OUTPATIENT
Start: 2022-09-06 | End: 2022-09-06

## 2022-09-06 RX ORDER — SODIUM CHLORIDE 9 MG/ML
25 INJECTION, SOLUTION INTRAVENOUS CONTINUOUS
Status: CANCELLED | OUTPATIENT
Start: 2022-11-01

## 2022-09-06 RX ORDER — HYDROCORTISONE SODIUM SUCCINATE 100 MG/2ML
100 INJECTION, POWDER, FOR SOLUTION INTRAMUSCULAR; INTRAVENOUS AS NEEDED
Status: CANCELLED | OUTPATIENT
Start: 2022-11-01

## 2022-09-06 RX ORDER — SODIUM CHLORIDE 0.9 % (FLUSH) 0.9 %
10 SYRINGE (ML) INJECTION AS NEEDED
Status: CANCELLED | OUTPATIENT
Start: 2022-11-01

## 2022-09-06 RX ORDER — SODIUM CHLORIDE 0.9 % (FLUSH) 0.9 %
10 SYRINGE (ML) INJECTION AS NEEDED
Status: DISPENSED | OUTPATIENT
Start: 2022-09-06 | End: 2022-09-06

## 2022-09-06 RX ORDER — HEPARIN 100 UNIT/ML
300-500 SYRINGE INTRAVENOUS AS NEEDED
Status: CANCELLED
Start: 2022-11-01

## 2022-09-06 RX ADMIN — ACETAMINOPHEN 650 MG: 325 TABLET ORAL at 08:29

## 2022-09-06 RX ADMIN — SODIUM CHLORIDE 25 ML/HR: 9 INJECTION, SOLUTION INTRAVENOUS at 08:50

## 2022-09-06 RX ADMIN — INFLIXIMAB 700 MG: 100 INJECTION, POWDER, LYOPHILIZED, FOR SOLUTION INTRAVENOUS at 09:09

## 2022-09-06 RX ADMIN — Medication 10 ML: at 08:30

## 2022-09-06 RX ADMIN — Medication 10 ML: at 11:15

## 2022-09-06 NOTE — DISCHARGE INSTRUCTIONS
Infliximab-abda (Por inyección)   Se [place] para tratar la artritis reumatoide, artritis psoriásica, espondilitis anquilosante, enfermedad de Crohn, las placas de psoriasis y colitis ulcerativa. Yessica(s) : Renflexis   Existen muchas otras marcas de kendy medicamento. Kendy medicamento no debe ser usado cuando:   Kendy medicamento no es adecuado para todas las personas. Usted no debe recibirlo si rivera tenido elayne reacción alérgica a los productos con infliximab o a las proteínas murinas (ratón). Forma de usar kendy medicamento:   Inyectable  Guzman médico le prescribirá guzman dosis y horario. Kendy medicamento se administra a través de elayne aguja en la vena. Kendy medicamento debe administrarse lentamente. La aguja tiene que permanecer colocada abdirahman al menos 2 horas. Elayne enfermera u otro médico le administrará kendy medicamento. Kendy medicamento debe venir con elayne Guía del medicamento. Solicite elayne copia con guzman farmacéutico en lorena de no tener la guía. Medicamentos y [employer] que debe evitar:   Consulte con guzman médico o farmacéutico antes de usar cualquier medicamento, incluyendo los que compra sin receta médica, las vitaminas y los productos herbales. Algunos alimentos y [employer] pueden afectar el funcionamiento de infliximab-abda. Informe a guzman médico si está usando cualquiera de los siguientes:  Abatacept, anakinra, ciclosporina, etanercept, teofilina, tocilizumab  Anticoagulantes (incluyendo warfarina)  Medicamentos que debilitan el sistema inmunológico (incluyendo metotrexato o un esteroide)  Infórmele al médico si usted ha recibido fototerapia para tratar la psoriasis. Usted no debe recibir [employer] sin aprobación médica. Elinda Morales de virus vivos podría causar elayne infección. Los niños deben estar al corriente con todas las vacunas antes de comenzar el tratamiento con kendy medicamento. Si usted recibió infliximab-abda abdirahman el embarazo, informe al médico de guzman bebé.   Precauciones abdirahman el uso de [place] medicamento:   Infórmele al médico si usted está embarazada o dando de lactar, o si padece de insuficiencia cardíaca, enfermedad de obstrucción pulmonar crónica (EOPC), enfermedad hepática, un trastorno sanguíneo, problemas de la damian o [address], cáncer, o enfermedad cardíaca. Infórmele al médico si usted tiene antecedentes de convulsiones, esclerosis múltiple, síndrome de Guillain-Barré, u otra enfermedad similar del sistema nervioso. Kendy medicamento puede causar los siguientes problemas:  Mayor riesgo de linfoma u otros cánceres (inclusive cáncer de la piel)  Daño hepático  Reacción a la infusión abdirahman o después de la infusión, o si comienza a usar el medicamento nuevamente después de no recibirlo por un tiempo prolongado  Síndrome parecido al lupus  Kendy medicamento podría causarle sangrado, moretones, y que desarrolle infecciones con más facilidad. [place] precauciones para prevenir enfermedades y lesiones. Lávese las bina frecuentemente. Kendy medicamento aumenta guzman riego de contraer infecciones, y puede resultar en akosua enfermedad severa o de peligro mortal. Infórmele a guzman médico si usted tiene un historial de infecciones frecuentes o serias, incluyendo tuberculosis o hepatitis B. Asegúrese que guzman médico sepa si tiene akosua infección actual o si padece de problemas con el sistema inmunológico. El riesgo puede ser Gabriela Marv en personas mayores de 72 años o que tienen diabetes. Evite a las personas con enfermedades, y [employer]. Guzman médico tendrá que revisar guzman progreso y los efectos de kendy medicamento abdirahman russ citas regulares. Cumpla sin falta con todas russ citas médicas. Asista a todas russ citas.   Efectos secundarios que pueden presentarse abdirahman el uso de kendy medicamento:   Consulte inmediatamente con el médico si nota cualquiera de estos efectos secundarios:  Reacción alérgica: Comezón o ronchas, hinchazón del liliane o las bina, hinchazón u hormigueo en la boca o garganta, opresión en el pecho, dificultad para respirar  [place] oscura o heces pálidas, náuseas, vómitos, falta de apetito, dolor estomacal, coloración amarillenta en la piel u ojos  Fiebre, escalofríos, tos, flujo o congestión nasal, dolor de garganta, shu de cuerpo  Dolor o [place] articulaciones o los músculos, problemas para tragar  Dolor de marcia, desvanecimiento, mareos, dificultad para respirar, sarpullido  Convulsiones, adormecimiento, hormigueo, problemas con la vista, el habla o al caminar  [facility], cansancio o debilidad  Piel tibia, lesia, inflamada o adolorida, ampollas o llagas en la piel  Consulte con el médico si nota los siguientes efectos secundarios menos graves:   Enrojecimiento, dolor, o inflamación donde se coloca la aguja  Consulte con el médico si nota otros efectos secundarios que shamika son causados por brody medicamento. Llame a guzman médico para consultarle Richy. Usted puede notificar russ efectos secundarios al FDA al [phone]. © 2017 [facility] Information is for End User's use only and may not be sold, redistributed or otherwise used for commercial purposes. Esta información es sólo para uso en educación. Guzman intención no es darle un consejo médico sobre enfermedades o tratamientos. Colsulte con guzman Elray Carlos Manuel farmacéutico antes de seguir cualquier régimen médico para saber si es seguro y efectivo para usted.

## 2022-09-06 NOTE — PROGRESS NOTES
[facility] Progress Note  Date: September 6, 2022       Treatment: Renflexis      Mr. Radha Zapata arrived in no acute distress at 0805. Patient COVID Screening completed:  Do you have any symptoms of COVID-19? SOB, coughing, fever, or generally not feeling well? NO  Have you been exposed to COVID-19 recently? NO  Have you had any recent contact with family/friend that has a pending COVID test? NO    Assessment was unremarkable with no new concerns voiced. 24G PIV established in R A/C with positive blood return noted. Problem: Knowledge Deficit  Goal: *Verbalizes understanding of procedures and medications  Outcome: Progressing Towards Goal     Problem: Patient Education:  Go to Education Activity  Goal: Patient/Family Education  Outcome: Progressing Towards Goal    Mr. Ozzy Lockwood Jeferson's vitals for today's visit. Patient Vitals for the past 12 hrs:   Temp Pulse Resp BP SpO2   09/06/22 0811 97.1 °F (36.2 °C) 60 16 115/68 100 %         Medications given:  Medications Administered       0.9% sodium chloride infusion       Admin Date  09/06/2022 Action  New Bag Dose  25 mL/hr Rate  25 mL/hr Route  IntraVENous Administered By  Mauricio Melchor RN              acetaminophen (TYLENOL) tablet 650 mg       Admin Date  09/06/2022 Action  Given Dose  650 mg Route  Oral Administered By  Mauricio Melchor RN              inFLIXimab-abda (RENFLEXIS) 700 mg in 0.9% sodium chloride 250 mL, overfill volume 25 mL infusion       Admin Date  09/06/2022 Action  New Bag Dose  700 mg Route  IntraVENous Administered By  Mauricio Melchor RN              sodium chloride (NS) flush 10 mL       Admin Date  09/06/2022 Action  Given Dose  10 mL Route  IntraVENous Administered By  Mauricio eMlchor RN                   Mr. Radha Zapata tolerated the treatment without complaints. PIV flushed and removed, 2x2 and coban placed. Mr. Radha Zapata was discharged from [place] [address] in stable condition at 1115. Future Appointments   Date Time Provider Michelle Ho   11/1/2022  8:00 AM [facility] INFUSION NURSE [address] [place] [address]       Rachel Nix RN  September 6, 2022  8:56 AM

## 2022-11-01 ENCOUNTER — HOSPITAL ENCOUNTER (OUTPATIENT)
Dept: INFUSION THERAPY | Age: 47
Discharge: HOME | End: 2022-11-01
Attending: INTERNAL MEDICINE

## 2022-11-01 VITALS
HEIGHT: 65 IN | WEIGHT: 162.2 LBS | BODY MASS INDEX: 27.02 KG/M2 | RESPIRATION RATE: 16 BRPM | TEMPERATURE: 96 F | OXYGEN SATURATION: 98 % | SYSTOLIC BLOOD PRESSURE: 105 MMHG | HEART RATE: 54 BPM | DIASTOLIC BLOOD PRESSURE: 62 MMHG

## 2022-11-01 DIAGNOSIS — M05.79 SEROPOSITIVE RHEUMATOID ARTHRITIS OF MULTIPLE SITES (HCC): ICD-10-CM

## 2022-11-01 DIAGNOSIS — M05.79 SEROPOSITIVE RHEUMATOID ARTHRITIS OF MULTIPLE SITES (HCC): Primary | ICD-10-CM

## 2022-11-01 DIAGNOSIS — T80.90XA INFUSION REACTION, INITIAL ENCOUNTER: Primary | ICD-10-CM

## 2022-11-01 LAB
ALBUMIN SERPL-MCNC: 3.5 G/DL (ref 3.5–5)
ALBUMIN/GLOB SERPL: 0.8 {RATIO} (ref 1.1–2.2)
ALP SERPL-CCNC: 59 U/L (ref 45–117)
ALT SERPL-CCNC: 40 U/L (ref 12–78)
ANION GAP SERPL CALC-SCNC: 8 MMOL/L (ref 5–15)
AST SERPL-CCNC: 30 U/L (ref 15–37)
BASOPHILS # BLD: 0 K/UL (ref 0–0.1)
BASOPHILS NFR BLD: 1 % (ref 0–1)
BILIRUB SERPL-MCNC: 1.3 MG/DL (ref 0.2–1)
BUN SERPL-MCNC: 17 MG/DL (ref 6–20)
BUN/CREAT SERPL: 21 (ref 12–20)
CALCIUM SERPL-MCNC: 9 MG/DL (ref 8.5–10.1)
CHLORIDE SERPL-SCNC: 102 MMOL/L (ref 97–108)
CO2 SERPL-SCNC: 26 MMOL/L (ref 21–32)
CREAT SERPL-MCNC: 0.8 MG/DL (ref 0.7–1.3)
CRP SERPL-MCNC: <0.29 MG/DL (ref 0–0.6)
DIFFERENTIAL METHOD BLD: ABNORMAL
EOSINOPHIL # BLD: 0.1 K/UL (ref 0–0.4)
EOSINOPHIL NFR BLD: 1 % (ref 0–7)
ERYTHROCYTE [DISTWIDTH] IN BLOOD BY AUTOMATED COUNT: 12.8 % (ref 11.5–14.5)
ERYTHROCYTE [SEDIMENTATION RATE] IN BLOOD: 30 MM/HR (ref 0–15)
GLOBULIN SER CALC-MCNC: 4.2 G/DL (ref 2–4)
GLUCOSE SERPL-MCNC: 118 MG/DL (ref 65–100)
HCT VFR BLD AUTO: 41.5 % (ref 36.6–50.3)
HGB BLD-MCNC: 14.7 G/DL (ref 12.1–17)
IMM GRANULOCYTES # BLD AUTO: 0 K/UL (ref 0–0.04)
IMM GRANULOCYTES NFR BLD AUTO: 0 % (ref 0–0.5)
LYMPHOCYTES # BLD: 1.6 K/UL (ref 0.8–3.5)
LYMPHOCYTES NFR BLD: 42 % (ref 12–49)
MCH RBC QN AUTO: 29.5 PG (ref 26–34)
MCHC RBC AUTO-ENTMCNC: 35.4 G/DL (ref 30–36.5)
MCV RBC AUTO: 83.2 FL (ref 80–99)
MONOCYTES # BLD: 0.5 K/UL (ref 0–1)
MONOCYTES NFR BLD: 12 % (ref 5–13)
NEUTS SEG # BLD: 1.7 K/UL (ref 1.8–8)
NEUTS SEG NFR BLD: 44 % (ref 32–75)
NRBC # BLD: 0 K/UL (ref 0–0.01)
NRBC BLD-RTO: 0 PER 100 WBC
PLATELET # BLD AUTO: 105 K/UL (ref 150–400)
PMV BLD AUTO: 11.8 FL (ref 8.9–12.9)
POTASSIUM SERPL-SCNC: 3.4 MMOL/L (ref 3.5–5.1)
PROT SERPL-MCNC: 7.7 G/DL (ref 6.4–8.2)
RBC # BLD AUTO: 4.99 M/UL (ref 4.1–5.7)
SODIUM SERPL-SCNC: 136 MMOL/L (ref 136–145)
WBC # BLD AUTO: 3.9 K/UL (ref 4.1–11.1)

## 2022-11-01 PROCEDURE — 96365 THER/PROPH/DIAG IV INF INIT: CPT

## 2022-11-01 PROCEDURE — 85652 RBC SED RATE AUTOMATED: CPT

## 2022-11-01 PROCEDURE — 74011250636 HC RX REV CODE- 250/636: Performed by: INTERNAL MEDICINE

## 2022-11-01 PROCEDURE — 74011250637 HC RX REV CODE- 250/637: Performed by: INTERNAL MEDICINE

## 2022-11-01 PROCEDURE — 86140 C-REACTIVE PROTEIN: CPT

## 2022-11-01 PROCEDURE — 96375 TX/PRO/DX INJ NEW DRUG ADDON: CPT

## 2022-11-01 PROCEDURE — 36415 COLL VENOUS BLD VENIPUNCTURE: CPT

## 2022-11-01 PROCEDURE — 80053 COMPREHEN METABOLIC PANEL: CPT

## 2022-11-01 PROCEDURE — 85025 COMPLETE CBC W/AUTO DIFF WBC: CPT

## 2022-11-01 RX ORDER — ACETAMINOPHEN 325 MG/1
650 TABLET ORAL AS NEEDED
Start: 2022-12-27

## 2022-11-01 RX ORDER — CETIRIZINE HYDROCHLORIDE 10 MG/1
10 TABLET ORAL ONCE
Status: COMPLETED | OUTPATIENT
Start: 2022-11-01 | End: 2022-11-01

## 2022-11-01 RX ORDER — LORATADINE 10 MG/1
10 TABLET ORAL ONCE
Status: DISCONTINUED | OUTPATIENT
Start: 2022-11-01 | End: 2022-11-01

## 2022-11-01 RX ORDER — ALBUTEROL SULFATE 0.83 MG/ML
2.5 SOLUTION RESPIRATORY (INHALATION) AS NEEDED
Start: 2022-12-27

## 2022-11-01 RX ORDER — DIPHENHYDRAMINE HYDROCHLORIDE 50 MG/ML
50 INJECTION, SOLUTION INTRAMUSCULAR; INTRAVENOUS AS NEEDED
Start: 2022-12-27

## 2022-11-01 RX ORDER — DIPHENHYDRAMINE HYDROCHLORIDE 50 MG/ML
25 INJECTION, SOLUTION INTRAMUSCULAR; INTRAVENOUS AS NEEDED
Start: 2022-12-27

## 2022-11-01 RX ORDER — SODIUM CHLORIDE 9 MG/ML
10 INJECTION INTRAMUSCULAR; INTRAVENOUS; SUBCUTANEOUS AS NEEDED
OUTPATIENT
Start: 2022-12-27

## 2022-11-01 RX ORDER — HEPARIN 100 UNIT/ML
300-500 SYRINGE INTRAVENOUS AS NEEDED
Start: 2022-12-27

## 2022-11-01 RX ORDER — ONDANSETRON 2 MG/ML
8 INJECTION INTRAMUSCULAR; INTRAVENOUS AS NEEDED
OUTPATIENT
Start: 2022-12-27

## 2022-11-01 RX ORDER — LORATADINE 10 MG/1
10 TABLET ORAL ONCE
Qty: 1 TABLET | Refills: 0 | Status: SHIPPED
Start: 2022-11-01 | End: 2022-11-01

## 2022-11-01 RX ORDER — SODIUM CHLORIDE 0.9 % (FLUSH) 0.9 %
10 SYRINGE (ML) INJECTION AS NEEDED
OUTPATIENT
Start: 2022-12-27

## 2022-11-01 RX ORDER — SODIUM CHLORIDE 9 MG/ML
25 INJECTION, SOLUTION INTRAVENOUS CONTINUOUS
Status: DISPENSED | OUTPATIENT
Start: 2022-11-01 | End: 2022-11-01

## 2022-11-01 RX ORDER — HYDROCORTISONE SODIUM SUCCINATE 100 MG/2ML
100 INJECTION, POWDER, FOR SOLUTION INTRAMUSCULAR; INTRAVENOUS AS NEEDED
OUTPATIENT
Start: 2022-12-27

## 2022-11-01 RX ORDER — ACETAMINOPHEN 325 MG/1
650 TABLET ORAL ONCE
Status: COMPLETED | OUTPATIENT
Start: 2022-11-01 | End: 2022-11-01

## 2022-11-01 RX ORDER — ACETAMINOPHEN 325 MG/1
650 TABLET ORAL ONCE
Start: 2022-12-27 | End: 2022-12-27

## 2022-11-01 RX ORDER — PREDNISONE 10 MG/1
TABLET ORAL
Qty: 20 TABLET | Refills: 0 | Status: SHIPPED | OUTPATIENT
Start: 2022-11-01 | End: 2022-11-09

## 2022-11-01 RX ORDER — EPINEPHRINE 1 MG/ML
0.3 INJECTION, SOLUTION, CONCENTRATE INTRAVENOUS AS NEEDED
OUTPATIENT
Start: 2022-12-27

## 2022-11-01 RX ORDER — SODIUM CHLORIDE 9 MG/ML
25 INJECTION, SOLUTION INTRAVENOUS CONTINUOUS
OUTPATIENT
Start: 2022-12-27

## 2022-11-01 RX ADMIN — METHYLPREDNISOLONE SODIUM SUCCINATE 40 MG: 40 INJECTION, POWDER, FOR SOLUTION INTRAMUSCULAR; INTRAVENOUS at 10:09

## 2022-11-01 RX ADMIN — ACETAMINOPHEN 650 MG: 325 TABLET ORAL at 08:28

## 2022-11-01 RX ADMIN — SODIUM CHLORIDE 25 ML/HR: 9 INJECTION, SOLUTION INTRAVENOUS at 08:30

## 2022-11-01 RX ADMIN — INFLIXIMAB 700 MG: 100 INJECTION, POWDER, LYOPHILIZED, FOR SOLUTION INTRAVENOUS at 09:15

## 2022-11-01 RX ADMIN — CETIRIZINE HYDROCHLORIDE 10 MG: 10 TABLET, FILM COATED ORAL at 10:30

## 2022-11-01 NOTE — PROGRESS NOTES
[facility] Progress Note    Date: 2022    Name: Sis Cohen    MRN: [id number]         : 1975    Mr. Alton Brunner Arrived ambulatory and in no distress for Renflexis (stopped due to reaction; see below). Assessment was completed, no acute issues at this time, no new complaints voiced. 24 gauge IV started without difficulty in right [facility], labs drawn & sent for processing. Mr. Alan Govea's vitals were reviewed. Patient Vitals for the past 12 hrs:   Temp Pulse Resp BP SpO2   22 1034 -- (!) 54 -- 105/62 98 %   22 0951 -- (!) 56 -- 109/73 98 %   22 0809 (!) 96 °F (35.6 °C) 63 16 110/68 100 %     Lab results were obtained and reviewed. Recent Results (from the past 12 hour(s))   C REACTIVE PROTEIN, QT    Collection Time: 22  8:15 AM   Result Value Ref Range    C-Reactive protein <0.29 0.00 - 8.27 mg/dL   METABOLIC PANEL, COMPREHENSIVE    Collection Time: 22  8:15 AM   Result Value Ref Range    Sodium 136 136 - 145 mmol/L    Potassium 3.4 (L) 3.5 - 5.1 mmol/L    Chloride 102 97 - 108 mmol/L    CO2 26 21 - 32 mmol/L    Anion gap 8 5 - 15 mmol/L    Glucose 118 (H) 65 - 100 mg/dL    BUN 17 6 - 20 MG/DL    Creatinine 0.80 0.70 - 1.30 MG/DL    BUN/Creatinine ratio 21 (H) 12 - 20      eGFR >60 >60 ml/min/1.73m2    Calcium 9.0 8.5 - 10.1 MG/DL    Bilirubin, total 1.3 (H) 0.2 - 1.0 MG/DL    ALT (SGPT) 40 12 - 78 U/L    AST (SGOT) 30 15 - 37 U/L    Alk. phosphatase 59 45 - 117 U/L    Protein, total 7.7 6.4 - 8.2 g/dL    Albumin 3.5 3.5 - 5.0 g/dL    Globulin 4.2 (H) 2.0 - 4.0 g/dL    A-G Ratio 0.8 (L) 1.1 - 2.2     CBC WITH AUTOMATED DIFF    Collection Time: 22  8:15 AM   Result Value Ref Range    WBC 3.9 (L) 4.1 - 11.1 K/uL    RBC 4.99 4. 10 - 5.70 M/uL    HGB 14.7 12.1 - 17.0 g/dL    HCT 41.5 36.6 - 50.3 %    MCV 83.2 80.0 - 99.0 FL    MCH 29.5 26.0 - 34.0 PG    MCHC 35.4 30.0 - 36.5 g/dL    RDW 12.8 11.5 - 14.5 %    PLATELET 526 (L) 052 - 400 K/uL MPV 11.8 8.9 - 12.9 FL    NRBC 0.0 0  WBC    ABSOLUTE NRBC 0.00 0.00 - 0.01 K/uL    NEUTROPHILS 44 32 - 75 %    LYMPHOCYTES 42 12 - 49 %    MONOCYTES 12 5 - 13 %    EOSINOPHILS 1 0 - 7 %    BASOPHILS 1 0 - 1 %    IMMATURE GRANULOCYTES 0 0.0 - 0.5 %    ABS. NEUTROPHILS 1.7 (L) 1.8 - 8.0 K/UL    ABS. LYMPHOCYTES 1.6 0.8 - 3.5 K/UL    ABS. MONOCYTES 0.5 0.0 - 1.0 K/UL    ABS. EOSINOPHILS 0.1 0.0 - 0.4 K/UL    ABS. BASOPHILS 0.0 0.0 - 0.1 K/UL    ABS. IMM. GRANS. 0.0 0.00 - 0.04 K/UL    DF AUTOMATED     SED RATE (ESR)    Collection Time: 11/01/22  8:15 AM   Result Value Ref Range    Sed rate, automated 30 (H) 0 - 15 mm/hr       Medications:  Medications Administered       0.9% sodium chloride infusion       Admin Date  11/01/2022 Action  New Bag Dose  25 mL/hr Rate  25 mL/hr Route  IntraVENous Administered By  Virgie Rushing RN              acetaminophen (TYLENOL) tablet 650 mg       Admin Date  11/01/2022 Action  Given Dose  650 mg Route  Oral Administered By  Virgie Rushing RN              cetirizine (ZYRTEC) tablet 10 mg       Admin Date  11/01/2022 Action  Given Dose  10 mg Route  Oral Administered By  Virgie Rushing RN              inFLIXimab-abda (RENFLEXIS) 700 mg in 0.9% sodium chloride 250 mL, overfill volume 25 mL infusion       Admin Date  11/01/2022 Action  New Bag Dose  700 mg Rate  138 mL/hr Route  IntraVENous Administered By  Virgie Rushing RN              methylPREDNISolone (PF) (SOLU-MEDROL) injection 40 mg       Admin Date  11/01/2022 Action  Given Dose  40 mg Route  IntraVENous Administered By  Pawan Barakat RN                  0769 Pt reports itching on back and arms. Multiple hives noted on back. Infusion stopped. NS started. VSS. Pt denies any other symptoms. Notified Dr. Soraya Newell. 1000 Dr. Soraya Newell ordered to discontinue infusion and future infusions. Also ordered Solu-Medrol 40 mg IV and Zyrtec po.   1009 Solu-Medrol administered.  Pt reports itching has improved. 1034 Zyrtec po administered. Pt reports itching has ceased. Mr. Maria M Verma tolerated treatment well and was discharged from [place] [address] in stable condition at 1035. Pt in no acute distress Pt to follow-up with Dr. Moise Lomas regarding future appointments and treatments.  Pt given CECILIO Nina RN  November 1, 2022

## 2022-11-01 NOTE — DISCHARGE INSTRUCTIONS
Dr. Wilberto Casey will call in 40mg prednisone to your pharmacy to taper over the next week, you should take Zyrtec once daily (or twice daily if you continues to get hives), and someone from Dr. Debra Bonilla office will call you to get you schedules to see him ASAP so we can discuss what we're replacing infliximab with

## 2022-11-02 ENCOUNTER — TELEPHONE (OUTPATIENT)
Dept: RHEUMATOLOGY | Age: 47
End: 2022-11-02

## 2022-11-02 DIAGNOSIS — M05.79 SEROPOSITIVE RHEUMATOID ARTHRITIS OF MULTIPLE SITES (HCC): Primary | ICD-10-CM

## 2022-11-02 DIAGNOSIS — R76.8 ANA POSITIVE: ICD-10-CM

## 2022-11-02 DIAGNOSIS — D69.6 THROMBOCYTOPENIA (HCC): ICD-10-CM

## 2022-11-02 DIAGNOSIS — R17 ELEVATED BILIRUBIN: ICD-10-CM

## 2022-11-02 NOTE — PROGRESS NOTES
Platelets are a bit lower, labs otherwise stable. Ordering further testing to help determine if platelets are truly decreased or just clumping.  Please coordinate with patient -CL

## 2022-12-27 ENCOUNTER — HOSPITAL ENCOUNTER (OUTPATIENT)
Dept: INFUSION THERAPY | Age: 47
Discharge: HOME | End: 2022-12-27

## 2022-12-27 ENCOUNTER — DOCUMENTATION ONLY (OUTPATIENT)
Dept: RHEUMATOLOGY | Age: 47
End: 2022-12-27

## 2022-12-27 NOTE — PROGRESS NOTES
Received call from infusion center about todays infusion. Saw this in the chart from previous infusion:    Previous infusion 11/1- 0945 Pt reports itching on back and arms. Multiple hives noted on back. Infusion stopped. NS started. VSS. Pt denies any other symptoms. Notified Dr. Chris Beckham. 1000 Dr. Chris Beckham ordered to discontinue infusion and future infusions. Also ordered Solu-Medrol 40 mg IV and Zyrtec po.   1009 Solu-Medrol administered. Pt reports itching has improved. 1034 Zyrtec po administered. Pt reports itching has ceased. Today, 12/27 -  We told the infusion center that the patient will need to follow up with Dr. Chris Beckham and discuss another medication since there was a reaction to infliximab.

## 2023-01-04 ENCOUNTER — TELEPHONE (OUTPATIENT)
Dept: FAMILY MEDICINE CLINIC | Age: 48
End: 2023-01-04

## 2023-01-04 NOTE — TELEPHONE ENCOUNTER
Patient called OW requesting information about AN renewal. OW explained the process to renew AN to patient and provided [facility] phone number to patient for him to schedule and appt. since it's been more than a year that patient was seen by a [facility] provider and his AN program has already . Patient verbalized understanding.

## 2023-01-13 ENCOUNTER — HOSPITAL ENCOUNTER (OUTPATIENT)
Dept: LAB | Age: 48
Discharge: HOME | End: 2023-01-13

## 2023-01-13 ENCOUNTER — OFFICE VISIT (OUTPATIENT)
Dept: FAMILY MEDICINE CLINIC | Age: 48
End: 2023-01-13

## 2023-01-13 VITALS
DIASTOLIC BLOOD PRESSURE: 73 MMHG | SYSTOLIC BLOOD PRESSURE: 115 MMHG | BODY MASS INDEX: 26.66 KG/M2 | HEIGHT: 65 IN | OXYGEN SATURATION: 97 % | WEIGHT: 160 LBS | TEMPERATURE: 99 F | HEART RATE: 64 BPM

## 2023-01-13 DIAGNOSIS — R68.89 COLD INTOLERANCE: ICD-10-CM

## 2023-01-13 DIAGNOSIS — M05.79 RHEUMATOID ARTHRITIS INVOLVING MULTIPLE SITES WITH POSITIVE RHEUMATOID FACTOR (HCC): Primary | ICD-10-CM

## 2023-01-13 PROCEDURE — 82306 VITAMIN D 25 HYDROXY: CPT

## 2023-01-13 PROCEDURE — 82607 VITAMIN B-12: CPT

## 2023-01-13 PROCEDURE — 84443 ASSAY THYROID STIM HORMONE: CPT

## 2023-01-13 PROCEDURE — 99213 OFFICE O/P EST LOW 20 MIN: CPT | Performed by: FAMILY MEDICINE

## 2023-01-13 PROCEDURE — 36415 COLL VENOUS BLD VENIPUNCTURE: CPT

## 2023-01-13 NOTE — PROGRESS NOTES
Leonie Garcia (: 1975) is a 52 y.o. male, established patient, here for evaluation of the following chief complaint(s): Other ( Renew Acces now)       ASSESSMENT/PLAN:  1. Rheumatoid arthritis involving multiple sites with positive rheumatoid factor (HCC)  Well controlled but with recent reaction/changes on labs. Follow up with Rheumatology for ongoing tx. Recommended completely abstaining from alcohol and pt agrees. -     REFERRAL TO RHEUMATOLOGY  2. Cold intolerance  -     VITAMIN D, 25 HYDROXY; Future  -     TSH 3RD GENERATION; Future  -     VITAMIN B12 & FOLATE; Future        SUBJECTIVE:  Rheumatoid Arthritis:  followed by Rheumatology. Was receiving MTX, infusions. Intermittent mild sx in fingers, wrists, heels but not swelling. RF: Positive. CCP Ab: Positive. Shx:  drinks alcohol on weekends but has decided to stop entirely. Review of Systems   Constitutional:  Negative for chills and fever. Respiratory:  Negative for cough. Musculoskeletal:  Negative for joint swelling. Feels cold at times. OBJECTIVE:  Blood pressure 115/73, pulse 64, temperature 99 °F (37.2 °C), temperature source Temporal, height 5' 5\" (1.651 m), weight 160 lb (72.6 kg), SpO2 97 %. Physical Exam  CONSTITUTIONAL:  Well developed. No apparent distress. PSYCHIATRIC: Oriented to time, place, person & situation. Appropriate mood and affect. NECK:  Normal inspection, normal palpation without any lymphadenopathy, masses, or thyromegaly  CARDIOVASCULAR:  Regular rate and rhythm. Normal S1, S2. No extra sounds. RESPIRATORY:  Normal effort. Normal ascultation without wheezing. ABDOMEN:  Normal bowel sounds. Abdomen soft, non tender. No hepatosplenomegaly or masses. EXTREMITIES:  No edema. MUSCULOSKELETAL:  no joint swelling, warmth, deformity in hands or wrists. No results found for this visit on 23. An electronic signature was used to authenticate this note.   -- Kimberly Webster MD

## 2023-01-13 NOTE — PROGRESS NOTES
Coordination of Care  1. Have you been to the ER, urgent care clinic since your last visit? Hospitalized since your last visit? No    2. Have you seen or consulted any other health care providers outside of the [address] [place] [address] since your last visit? Include any pap smears or colon screening. No    Does the patient need refills? NO    Learning Assessment Complete?  yes

## 2023-01-14 LAB
25(OH)D3 SERPL-MCNC: 17.4 NG/ML (ref 30–100)
COMMENT, HOLDF: NORMAL
FOLATE SERPL-MCNC: 11.3 NG/ML (ref 5–21)
SAMPLES BEING HELD,HOLD: NORMAL
TSH SERPL DL<=0.05 MIU/L-ACNC: 1.98 UIU/ML (ref 0.36–3.74)
VIT B12 SERPL-MCNC: 513 PG/ML (ref 193–986)

## 2023-01-16 NOTE — PROGRESS NOTES
Spoke with patient and reviewed labs. Start OTC Vitamin D3 1,000 units daily. Pt repeated instructions and dosing.   Other labs were normal.

## 2023-01-19 ENCOUNTER — OFFICE VISIT (OUTPATIENT)
Dept: FAMILY MEDICINE CLINIC | Age: 48
End: 2023-01-19

## 2023-01-19 DIAGNOSIS — Z71.89 COUNSELING AND COORDINATION OF CARE: Primary | ICD-10-CM

## 2023-01-19 PROCEDURE — 99080 SPECIAL REPORTS OR FORMS: CPT | Performed by: PHYSICIAN ASSISTANT

## 2023-01-19 NOTE — PROGRESS NOTES
AN financial screening is complete. Patient has been instructed to call on or after 2/2/23. Patient has opted out of [facility].

## 2023-01-23 ENCOUNTER — OFFICE VISIT (OUTPATIENT)
Dept: RHEUMATOLOGY | Age: 48
End: 2023-01-23

## 2023-01-23 VITALS
TEMPERATURE: 98 F | WEIGHT: 167 LBS | OXYGEN SATURATION: 96 % | RESPIRATION RATE: 16 BRPM | SYSTOLIC BLOOD PRESSURE: 114 MMHG | HEART RATE: 60 BPM | BODY MASS INDEX: 27.79 KG/M2 | DIASTOLIC BLOOD PRESSURE: 78 MMHG

## 2023-01-23 DIAGNOSIS — M05.79 SEROPOSITIVE RHEUMATOID ARTHRITIS OF MULTIPLE SITES (HCC): ICD-10-CM

## 2023-01-23 DIAGNOSIS — R76.8 ANA POSITIVE: Primary | ICD-10-CM

## 2023-01-23 DIAGNOSIS — Z79.60 LONG-TERM USE OF IMMUNOSUPPRESSANT MEDICATION: ICD-10-CM

## 2023-01-23 PROCEDURE — 99214 OFFICE O/P EST MOD 30 MIN: CPT | Performed by: INTERNAL MEDICINE

## 2023-01-23 RX ORDER — METHOTREXATE 2.5 MG/1
15 TABLET ORAL
Qty: 30 TABLET | Refills: 2 | Status: SHIPPED | OUTPATIENT
Start: 2023-01-25

## 2023-01-23 RX ORDER — AMPICILLIN TRIHYDRATE 500 MG
CAPSULE ORAL DAILY
COMMUNITY

## 2023-01-23 RX ORDER — FOLIC ACID 1 MG/1
1 TABLET ORAL DAILY
Qty: 90 TABLET | Refills: 5 | Status: SHIPPED | OUTPATIENT
Start: 2023-01-23

## 2023-01-23 NOTE — PROGRESS NOTES
REASON FOR VISIT    This is a follow-up visit for Mr. Diane Singh for     ICD-10-CM   1. Seropositive rheumatoid arthritis of multiple sites ([facility]  M05.79     Inflammatory arthritis phenotype includes:  Anti-CCP positive: yes (186)  Rheumatoid factor positive: yes (>600)  Erosive disease: N/A  Extra-articular manifestations include: none    Immunosuppression Screening (3/19/2021): Quantiferon TB: negative  PPD:  Not performed  Hepatitis B: negative  Hepatitis C: negative    Therapy History includes:  Current DMARD therapy includes: methotrexate 20 mg every Wednesday (3/04/2021 to present), infliximab 5 mg/kg every 8 weeks (6/21/2021-1/2022), 10mg/kg every 8 weeks (1/22- present)  Prior DMARD therapy includes:  hydroxychloroquine 400 mg daily (2/26/2021 to 8/31/2021), methotrexate 12.5 mg every Wednesday (2/26/2021 to 3/04/2021)  The following DMARDs have been ineffective: none  The following DMARDs were stopped because of side effects: none    HISTORY OF PRESENT ILLNESS    Mr. Diane Singh returns for a follow-up. Last visit 7/6/2022. Pt says that he feels \"like new. \" He says that he has not been getting his infliximab infusions because he was in [place]. He also does not take any Methotrexate. He has not been on any arthritis medications for 2 months. Pt has some pain in his ankle, but he tried to not think about it. Pt says that he feels cold all the time, but sometimes he feels very hot at night. Pt takes a natural supplement. Pt stopped drinking alcohol. He stopped smoking cigarettes about 2 years ago. REVIEW OF SYSTEMS    A comprehensive review of systems was performed and pertinent results are documented in the HPI, review of systems is otherwise non-contributory. PAST MEDICAL HISTORY    He has a past medical history of Rheumatoid arteritis ([facility] (01/2021). FAMILY HISTORY    His family history includes Osteoporosis in his mother.     SOCIAL HISTORY    He reports that he quit smoking about 21 months ago. His smoking use included cigarettes. He has never used smokeless tobacco. He reports current alcohol use of about 2.0 standard drinks per week. He reports that he does not currently use drugs. MEDICATIONS    Current Outpatient Medications   Medication Sig    diclofenac EC (VOLTAREN) 75 mg EC tablet Take 1 Tablet by mouth two (2) times daily as needed for Pain. (Patient not taking: Reported on 1/13/2023)    methotrexate (RHEUMATREX) 2.5 mg tablet Take 6 Tablets by mouth every Wednesday. (Patient not taking: Reported on 5/86/8563)    folic acid (FOLVITE) 1 mg tablet Take 1 Tablet by mouth daily. (Patient not taking: Reported on 1/13/2023)     No current facility-administered medications for this visit. ALLERGIES    Allergies   Allergen Reactions    Renflexis [Infliximab-Abda] Rash     11/1/22 infusion reaction       PHYSICAL EXAMINATION    There were no vitals taken for this visit. There is no height or weight on file to calculate BMI. General:  The patient is well developed, well nourished, alert, and in no apparent distress. Eyes: Sclera are anicteric. No conjunctival injection. HEENT:  Oropharynx is clear. No oral ulcers. Adequate salivary pooling. No cervical or supraclavicular lymphadenopathy. Lungs:  Clear to auscultation bilaterally, without wheeze or stridor. Normal respiratory effort. Cor:  Regular rate and rhythm. No murmur rub or gallop. Abdomen: Soft, non-tender, without hepatomegaly or masses. Extremities: No calf tenderness or edema. Warm and well perfused. Skin:  No significant abnormalities. Neuro: Nonfocal  Musculoskeletal:    A comprehensive musculoskeletal exam was performed for all joints of each upper and lower extremity and assessed for swelling, tenderness and range of motion. Results are documented as below:  No evidence of synovitis in the small joints of the hands, wrists, shoulders, elbows, hips, knees or ankles. ___  General:  The patient is fit-appearing, alert, and in no apparent distress. Eyes: Sclera are anicteric. No conjunctival injection. HEENT:  Oropharynx is clear. No oral ulcers. Adequate salivary pooling. No cervical or supraclavicular lymphadenopathy. Lungs:  Clear to auscultation bilaterally, without wheeze or stridor. Normal respiratory effort. Cor:  Regular rate and rhythm. No murmur rub or gallop. Abdomen: Soft, non-tender, without hepatomegaly or masses. Extremities: No calf tenderness or edema. Warm and well perfused. Skin:  No significant abnormalities. Neuro: Nonfocal  Musculoskeletal:    A comprehensive musculoskeletal exam was performed for all joints of each upper and lower extremity and assessed for swelling, tenderness and range of motion. Results are documented as below: Stable early heberden and kunal nodes in both first 2 fingers. L 5th finger fixed flexion deformity. No tenderness in hands. Chronic soft tissue prominence of L ankle without warmth or tenderness. No evidence of synovitis in the small joints of the hands, wrists, shoulders, elbows, hips, knees or ankles. DATA REVIEW    Laboratory   1/13/23: TSH 1.98, Vit B12 513, Folate 11.3, Vit D 17.4  11/1/22: ESR 30, WBC 3.9, Hgb 14.7, Plt 105, Cr 0.8, Tbili 1.3, ALT 40, AST 30, Alk phos 59, CRP < 0.29 mg/dL, ESR 24, Anti-DNA (DS) Ab QT 23  7/11/22: Plt 138, UA WNL, Pr urine 18, CBC WNL, Cr 0.97, LFT WNL, C4 13, C3 75, CRP <0.29 mg/dL,   3/21/22: CRP <0.29, ESR 49, Cr 0.95, AST 11, alk phos 66, ALT 44, Tbilli 1.1, WBC 4.2, HGB 14.6, Plt 249,   Recent laboratory results were reviewed, summarized, and discussed with the patient. Imaging    Musculoskeletal Ultrasound    None    Radiographs    None    CT Imaging    None    MR Imaging    None    DXA     None    ASSESSMENT AND PLAN    46yo with RF+,CCP+ rheumatoid arthritis with low disease activity on methotrexate 15mg weekly and infliximab 10mg/kg every 8 weeks.  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 Reviewed sun avoidance, SPF70+ sunblock. Previously direct NILE+ but antibody specificities not reported, rechecking. He prefers to hold off on resuming Plaquenil (hydroxychloroquine). 1. NILE positive  - NILE COMPREHENSIVE PLUS PANEL; Future  - SED RATE (ESR); Future  - C REACTIVE PROTEIN, QT; Future  - COMPLEMENT, C3 & C4; Future  - METABOLIC PANEL, COMPREHENSIVE; Future  - CBC WITH AUTOMATED DIFF; Future  - URINALYSIS W/ REFLEX CULTURE; Future  - PROT+CREATU (RANDOM); Future    2. Seropositive rheumatoid arthritis of multiple sites (HCC)  -Cont methotrexate 15mg weekly, infliximab 10mg/kg every 8 weeks  - NILE COMPREHENSIVE PLUS PANEL; Future  - SED RATE (ESR); Future  - C REACTIVE PROTEIN, QT; Future  - COMPLEMENT, C3 & C4; Future  - METABOLIC PANEL, COMPREHENSIVE; Future  - CBC WITH AUTOMATED DIFF; Future  - URINALYSIS W/ REFLEX CULTURE; Future  - PROT+CREATU (RANDOM); Future    3. Long-term use of immunosuppressant medication  - CBC WITH AUTOMATED DIFF; Future    There are no Patient Instructions on file for this visit. TODAY'S ORDERS    No orders of the defined types were placed in this encounter.     Future Appointments   Date Time Provider Michelle Ho   1/23/2023  3:00 PM Harish Frankel MD [facility] BS AMB   2/21/2023  8:00 AM [facility] INFUSION NURSE 1 [facility]     Face to face time: minutes  Note preparation and records review day of service: minutes  Total provider time day of service: minutes

## 2023-01-23 NOTE — PATIENT INSTRUCTIONS
1) Restart 6 pills of Methotrexate once per week (every Wednesday) with breakfast, and daily folic acid. 2) You can take 650mg of Tylenol up to 3 times a day for joint pain. 3) Take your temperature next time you feel hot at night. 4) Continue to take your Vitamin D supplement. 5) Check labs in one month. 6) Follow up in 2 months. If you have more joint pain and swelling before your follow-up, call the clinic let me know.

## 2023-01-23 NOTE — PROGRESS NOTES
Chief Complaint   Patient presents with    Joint Pain     1. Have you been to the ER, urgent care clinic since your last visit? Hospitalized since your last visit? No    2. Have you seen or consulted any other health care providers outside of the [address] [place] [address] since your last visit? Include any pap smears or colon screening.  No    Bacopa monnieri  Vitamin supplement

## 2023-01-25 ENCOUNTER — DOCUMENTATION ONLY (OUTPATIENT)
Dept: FAMILY MEDICINE CLINIC | Age: 48
End: 2023-01-25

## 2023-01-25 NOTE — PROGRESS NOTES
Current [facility] eligibility  on 22. Financial screening documents have been faxed to [employer] [facility] on 2023 at [phone] for eligibility renewal. Patient currently being followed by rheumatology with scheduled follow-up appt on unknown date.

## 2023-02-21 ENCOUNTER — HOSPITAL ENCOUNTER (OUTPATIENT)
Dept: INFUSION THERAPY | Age: 48
End: 2023-02-21